# Patient Record
Sex: FEMALE | Race: WHITE | NOT HISPANIC OR LATINO | Employment: PART TIME | URBAN - METROPOLITAN AREA
[De-identification: names, ages, dates, MRNs, and addresses within clinical notes are randomized per-mention and may not be internally consistent; named-entity substitution may affect disease eponyms.]

---

## 2017-01-27 ENCOUNTER — PREP FOR SURGERY (OUTPATIENT)
Dept: ORTHOPEDIC SURGERY | Facility: CLINIC | Age: 22
End: 2017-01-27

## 2017-01-27 DIAGNOSIS — S83.002S PATELLAR SUBLUXATION, LEFT, SEQUELA: Primary | ICD-10-CM

## 2017-01-27 RX ORDER — ACETAMINOPHEN 10 MG/ML
1000 INJECTION, SOLUTION INTRAVENOUS ONCE
Status: CANCELLED | OUTPATIENT
Start: 2017-02-10

## 2017-01-27 RX ORDER — OXYCODONE HCL 10 MG/1
10 TABLET, FILM COATED, EXTENDED RELEASE ORAL ONCE
Status: CANCELLED | OUTPATIENT
Start: 2017-01-27 | End: 2017-01-27

## 2017-01-27 RX ORDER — CELECOXIB 100 MG/1
200 CAPSULE ORAL ONCE
Status: CANCELLED | OUTPATIENT
Start: 2017-01-27 | End: 2017-01-27

## 2017-01-27 RX ORDER — PREGABALIN 25 MG/1
75 CAPSULE ORAL ONCE
Status: CANCELLED | OUTPATIENT
Start: 2017-01-27 | End: 2017-01-27

## 2017-02-02 ENCOUNTER — LAB (OUTPATIENT)
Dept: LAB | Facility: HOSPITAL | Age: 22
End: 2017-02-02
Attending: ORTHOPAEDIC SURGERY

## 2017-02-02 DIAGNOSIS — S83.002S PATELLAR SUBLUXATION, LEFT, SEQUELA: ICD-10-CM

## 2017-02-02 LAB
ANION GAP SERPL CALCULATED.3IONS-SCNC: 11.3 MMOL/L
BASOPHILS # BLD AUTO: 0.05 10*3/MM3 (ref 0–0.2)
BASOPHILS NFR BLD AUTO: 0.7 % (ref 0–2)
BUN BLD-MCNC: 13 MG/DL (ref 6–20)
BUN/CREAT SERPL: 16 (ref 7–25)
CALCIUM SPEC-SCNC: 8.9 MG/DL (ref 8.6–10.5)
CHLORIDE SERPL-SCNC: 104 MMOL/L (ref 98–107)
CO2 SERPL-SCNC: 24.7 MMOL/L (ref 22–29)
CREAT BLD-MCNC: 0.81 MG/DL (ref 0.57–1)
DEPRECATED RDW RBC AUTO: 40.6 FL (ref 37–54)
EOSINOPHIL # BLD AUTO: 0.69 10*3/MM3 (ref 0.1–0.3)
EOSINOPHIL NFR BLD AUTO: 9.2 % (ref 0–4)
ERYTHROCYTE [DISTWIDTH] IN BLOOD BY AUTOMATED COUNT: 12.1 % (ref 11.5–14.5)
GFR SERPL CREATININE-BSD FRML MDRD: 90 ML/MIN/1.73
GLUCOSE BLD-MCNC: 97 MG/DL (ref 65–99)
HCT VFR BLD AUTO: 41.2 % (ref 37–47)
HGB BLD-MCNC: 14 G/DL (ref 12–16)
IMM GRANULOCYTES # BLD: 0.02 10*3/MM3 (ref 0–0.03)
IMM GRANULOCYTES NFR BLD: 0.3 % (ref 0–0.5)
LYMPHOCYTES # BLD AUTO: 2.6 10*3/MM3 (ref 0.6–4.8)
LYMPHOCYTES NFR BLD AUTO: 34.5 % (ref 20–45)
MCH RBC QN AUTO: 31 PG (ref 27–31)
MCHC RBC AUTO-ENTMCNC: 34 G/DL (ref 31–37)
MCV RBC AUTO: 91.2 FL (ref 81–99)
MONOCYTES # BLD AUTO: 0.4 10*3/MM3 (ref 0–1)
MONOCYTES NFR BLD AUTO: 5.3 % (ref 3–8)
NEUTROPHILS # BLD AUTO: 3.78 10*3/MM3 (ref 1.5–8.3)
NEUTROPHILS NFR BLD AUTO: 50 % (ref 45–70)
NRBC BLD MANUAL-RTO: 0 /100 WBC (ref 0–0)
PLATELET # BLD AUTO: 277 10*3/MM3 (ref 140–500)
PMV BLD AUTO: 10.6 FL (ref 7.4–10.4)
POTASSIUM BLD-SCNC: 3.9 MMOL/L (ref 3.5–5.2)
RBC # BLD AUTO: 4.52 10*6/MM3 (ref 4.2–5.4)
SODIUM BLD-SCNC: 140 MMOL/L (ref 136–145)
WBC NRBC COR # BLD: 7.54 10*3/MM3 (ref 4.8–10.8)

## 2017-02-02 PROCEDURE — 85025 COMPLETE CBC W/AUTO DIFF WBC: CPT

## 2017-02-02 PROCEDURE — 80048 BASIC METABOLIC PNL TOTAL CA: CPT

## 2017-02-02 PROCEDURE — 36415 COLL VENOUS BLD VENIPUNCTURE: CPT

## 2017-02-03 NOTE — PAT
Pt history updated by phone.  Pre-op labs completed except for UA. Will check w/Dr Salazar's office to see if can send patient over after H&P.  Also will need Accu-check, HCG, and EKG day of surgery per anesthesia guidelines.

## 2017-02-09 ENCOUNTER — ANESTHESIA EVENT (OUTPATIENT)
Dept: PERIOP | Facility: HOSPITAL | Age: 22
End: 2017-02-09

## 2017-02-09 ENCOUNTER — OFFICE VISIT (OUTPATIENT)
Dept: ORTHOPEDIC SURGERY | Facility: CLINIC | Age: 22
End: 2017-02-09

## 2017-02-09 ENCOUNTER — PREP FOR SURGERY (OUTPATIENT)
Dept: ORTHOPEDIC SURGERY | Facility: CLINIC | Age: 22
End: 2017-02-09

## 2017-02-09 DIAGNOSIS — S83.002S PATELLAR SUBLUXATION, LEFT, SEQUELA: Primary | ICD-10-CM

## 2017-02-09 PROCEDURE — S0260 H&P FOR SURGERY: HCPCS | Performed by: ORTHOPAEDIC SURGERY

## 2017-02-09 NOTE — H&P
"Orthopedic Surgery    Patient Care Team:  Daren Rodriguez MD as PCP - General (Emergency Medicine)    CHIEF COMPLAINT: Left knee pain    HISTORY OF PRESENT ILLNESS: Patient is being admitted to undergo reconstruction of her medial retinaculum and VMO advancement for chronically subluxing left patella.  Patient has symptoms for over 3 years she try to treated nonoperatively failed to respond to be admitted this time to undergo the above stated procedure.  I reviewed with the patient the risk of surgery which include failure of stabilization patella and the need for further surgery.  Vascular injury, nerve injury, DVT, recurrent subluxation, degenerative arthritis developing in the knee.  Understands the rehabilitation which is 3-6 months.  She understands all of the above and wishes to proceed with surgery.          Past Medical History   Diagnosis Date   • Recurrent left knee instability      sched patellafemoral junction repair     Past Surgical History   Procedure Laterality Date   • No past surgeries       Family History   Problem Relation Age of Onset   • Diabetes Father      Social History   Substance Use Topics   • Smoking status: Never Smoker   • Smokeless tobacco: Never Used   • Alcohol use No     No prescriptions prior to admission.     Allergies:  Review of patient's allergies indicates no known allergies.    REVIEW OF SYSTEMS:  Please see the above history of present illness for pertinent positives and negatives.  The remainder of the patient's systems have been reviewed and are negative.    Vital Signs       Flowsheet Rows         First Filed Value    Admission Height  61\" (154.9 cm) Documented at 02/03/2017 1331    Admission Weight  206 lb (93.4 kg) Documented at 02/03/2017 1331           Physical Exam:  Physical Exam   Constitutional: Patient appears well-developed and well-nourished and in no acute distress   HEENT:   Head: Normocephalic and atraumatic.   Eyes:  Pupils are equal, round, and reactive " to light.  Mouth and Throat: Patient has moist mucous membranes. Oropharynx is clear of any erythema or exudate.     Neck: Neck supple. No JVD present. No thyromegaly present. No lymphadenopathy present.  Cardiovascular: Regular rate, regular rhythm.  Pulmonary/Chest: Lungs are clear to auscultation bilaterally.  Abdominal:benign,soft with bowel sounds  Musculoskeletal: Normal posture.  Extremities: Left lower extremity shows good distal pulses no motor deficit.  There is mild crepitus range of motion with patella subluxation and extension.    Neurological: Patient is alert and oriented.  Psychological:   Mood and behavior appropriate.  Skin: Skin is warm and dry.     Results Review:    I reviewed the patient's new clinical results.  Lab Results (most recent)     None          Imaging Results (most recent)     None            ECG/EMG Results (most recent)     None          Assessment/Plan  subluxing left patella        I discussed the patients findings and my recommendations with patient and plan to proceed with medial retinacular reconstruction and VMO advancement    Saqib Salazar MD  02/09/17  2:36 PM

## 2017-02-09 NOTE — PROGRESS NOTES
Subjective: Painful left knee     Patient ID: Bisi Bedoya is a 21 y.o. female.    Chief Complaint:    History of Present Illness patient seen for H&P done ago medial retinacular reconstruction of the left patella with the VMO advancement       Social History     Occupational History   • Not on file.     Social History Main Topics   • Smoking status: Never Smoker   • Smokeless tobacco: Never Used   • Alcohol use No   • Drug use: No   • Sexual activity: Defer      Review of Systems   Constitutional: Negative for chills, diaphoresis, fever and unexpected weight change.   HENT: Negative for hearing loss, nosebleeds, sore throat and tinnitus.    Eyes: Negative for pain and visual disturbance.   Respiratory: Negative for cough, shortness of breath and wheezing.    Cardiovascular: Negative for chest pain and palpitations.   Gastrointestinal: Negative for abdominal pain, diarrhea, nausea and vomiting.   Endocrine: Negative for cold intolerance, heat intolerance and polydipsia.   Genitourinary: Negative for difficulty urinating, dysuria and hematuria.   Musculoskeletal: Negative for arthralgias, joint swelling and myalgias.   Skin: Negative for rash and wound.   Allergic/Immunologic: Negative for environmental allergies.   Neurological: Negative for dizziness, syncope and numbness.   Hematological: Does not bruise/bleed easily.   Psychiatric/Behavioral: Negative for dysphoric mood and sleep disturbance. The patient is not nervous/anxious.    All other systems reviewed and are negative.        Past Medical History   Diagnosis Date   • Recurrent left knee instability      sched patellafemoral junction repair     Past Surgical History   Procedure Laterality Date   • No past surgeries       Family History   Problem Relation Age of Onset   • Diabetes Father          Objective:  There were no vitals filed for this visit.  There were no vitals filed for this visit.  There is no height or weight on file to calculate  BMI.       Ortho Exam  H&P completed    Assessment:     No diagnosis found.      Plan:      all questions regarding surgery answered      Work Status:    DENICE query complete.    Orders:  No orders of the defined types were placed in this encounter.      Medications:  New Medications Ordered This Visit   Medications   • Ondansetron 4 MG film     Sig: Take  by mouth.       Followup:  No Follow-up on file.          Dragon transcription disclaimer     Much of this encounter note is an electronic transcription/translation of spoken language to printed text. The electronic translation of spoken language may permit erroneous, or at times, nonsensical words or phrases to be inadvertently transcribed. Although I have reviewed the note for such errors, some may still exist.

## 2017-02-10 ENCOUNTER — ANESTHESIA (OUTPATIENT)
Dept: PERIOP | Facility: HOSPITAL | Age: 22
End: 2017-02-10

## 2017-02-10 ENCOUNTER — HOSPITAL ENCOUNTER (OUTPATIENT)
Facility: HOSPITAL | Age: 22
Setting detail: OBSERVATION
Discharge: HOME | End: 2017-02-11
Attending: ORTHOPAEDIC SURGERY | Admitting: ORTHOPAEDIC SURGERY

## 2017-02-10 ENCOUNTER — APPOINTMENT (OUTPATIENT)
Dept: GENERAL RADIOLOGY | Facility: HOSPITAL | Age: 22
End: 2017-02-10

## 2017-02-10 DIAGNOSIS — R52 PAIN: ICD-10-CM

## 2017-02-10 DIAGNOSIS — S83.002S PATELLAR SUBLUXATION, LEFT, SEQUELA: ICD-10-CM

## 2017-02-10 DIAGNOSIS — S83.002D PATELLAR SUBLUXATION, LEFT, SUBSEQUENT ENCOUNTER: Primary | ICD-10-CM

## 2017-02-10 PROBLEM — S83.003A PATELLAR SUBLUXATION: Status: ACTIVE | Noted: 2017-02-10

## 2017-02-10 LAB — HCG SERPL QL: NEGATIVE

## 2017-02-10 PROCEDURE — 76000 FLUOROSCOPY <1 HR PHYS/QHP: CPT

## 2017-02-10 PROCEDURE — C1713 ANCHOR/SCREW BN/BN,TIS/BN: HCPCS | Performed by: ORTHOPAEDIC SURGERY

## 2017-02-10 PROCEDURE — 94799 UNLISTED PULMONARY SVC/PX: CPT

## 2017-02-10 PROCEDURE — 25010000002 PROPOFOL 10 MG/ML EMULSION: Performed by: NURSE ANESTHETIST, CERTIFIED REGISTERED

## 2017-02-10 PROCEDURE — 25010000002 ONDANSETRON PER 1 MG: Performed by: NURSE ANESTHETIST, CERTIFIED REGISTERED

## 2017-02-10 PROCEDURE — 25010000002 ONDANSETRON PER 1 MG: Performed by: ORTHOPAEDIC SURGERY

## 2017-02-10 PROCEDURE — 25010000003 CEFAZOLIN PER 500 MG: Performed by: ORTHOPAEDIC SURGERY

## 2017-02-10 PROCEDURE — G0378 HOSPITAL OBSERVATION PER HR: HCPCS

## 2017-02-10 PROCEDURE — 25010000002 DIPHENHYDRAMINE PER 50 MG: Performed by: NURSE ANESTHETIST, CERTIFIED REGISTERED

## 2017-02-10 PROCEDURE — 84703 CHORIONIC GONADOTROPIN ASSAY: CPT | Performed by: ORTHOPAEDIC SURGERY

## 2017-02-10 PROCEDURE — 27422 REVISION OF UNSTABLE KNEECAP: CPT | Performed by: ORTHOPAEDIC SURGERY

## 2017-02-10 PROCEDURE — 25010000002 ROPIVACAINE PER 1 MG

## 2017-02-10 PROCEDURE — C1776 JOINT DEVICE (IMPLANTABLE): HCPCS | Performed by: ORTHOPAEDIC SURGERY

## 2017-02-10 PROCEDURE — 25010000002 MIDAZOLAM PER 1 MG: Performed by: NURSE ANESTHETIST, CERTIFIED REGISTERED

## 2017-02-10 DEVICE — SYS KN MPFL BIOCOMP: Type: IMPLANTABLE DEVICE | Site: KNEE | Status: FUNCTIONAL

## 2017-02-10 DEVICE — TNDN VERSAGRAFT PRE-SUT FZ STRL: Type: IMPLANTABLE DEVICE | Site: KNEE | Status: FUNCTIONAL

## 2017-02-10 DEVICE — SUT/ANCH BIOCOMP SWIVELOCK/C 4.75X19.1MM: Type: IMPLANTABLE DEVICE | Site: KNEE | Status: FUNCTIONAL

## 2017-02-10 RX ORDER — AMITRIPTYLINE HYDROCHLORIDE 10 MG/1
10 TABLET, FILM COATED ORAL NIGHTLY
Status: DISCONTINUED | OUTPATIENT
Start: 2017-02-10 | End: 2017-02-11 | Stop reason: HOSPADM

## 2017-02-10 RX ORDER — ONDANSETRON 2 MG/ML
4 INJECTION INTRAMUSCULAR; INTRAVENOUS ONCE AS NEEDED
Status: DISCONTINUED | OUTPATIENT
Start: 2017-02-10 | End: 2017-02-10 | Stop reason: HOSPADM

## 2017-02-10 RX ORDER — BACITRACIN ZINC 500 [USP'U]/G
OINTMENT TOPICAL AS NEEDED
Status: DISCONTINUED | OUTPATIENT
Start: 2017-02-10 | End: 2017-02-10 | Stop reason: HOSPADM

## 2017-02-10 RX ORDER — SUMATRIPTAN 25 MG/1
50 TABLET, FILM COATED ORAL ONCE
Status: DISCONTINUED | OUTPATIENT
Start: 2017-02-10 | End: 2017-02-11 | Stop reason: HOSPADM

## 2017-02-10 RX ORDER — OXYCODONE AND ACETAMINOPHEN 7.5; 325 MG/1; MG/1
2 TABLET ORAL EVERY 4 HOURS PRN
Status: DISCONTINUED | OUTPATIENT
Start: 2017-02-10 | End: 2017-02-11 | Stop reason: HOSPADM

## 2017-02-10 RX ORDER — NALOXONE HCL 0.4 MG/ML
0.4 VIAL (ML) INJECTION AS NEEDED
Status: DISCONTINUED | OUTPATIENT
Start: 2017-02-10 | End: 2017-02-10 | Stop reason: HOSPADM

## 2017-02-10 RX ORDER — ONDANSETRON 2 MG/ML
4 INJECTION INTRAMUSCULAR; INTRAVENOUS ONCE AS NEEDED
Status: COMPLETED | OUTPATIENT
Start: 2017-02-10 | End: 2017-02-10

## 2017-02-10 RX ORDER — MIDAZOLAM HYDROCHLORIDE 1 MG/ML
1 INJECTION INTRAMUSCULAR; INTRAVENOUS
Status: DISCONTINUED | OUTPATIENT
Start: 2017-02-10 | End: 2017-02-10 | Stop reason: HOSPADM

## 2017-02-10 RX ORDER — PREGABALIN 75 MG/1
75 CAPSULE ORAL ONCE
Status: COMPLETED | OUTPATIENT
Start: 2017-02-10 | End: 2017-02-10

## 2017-02-10 RX ORDER — MEPERIDINE HYDROCHLORIDE 25 MG/ML
12.5 INJECTION INTRAMUSCULAR; INTRAVENOUS; SUBCUTANEOUS
Status: DISCONTINUED | OUTPATIENT
Start: 2017-02-10 | End: 2017-02-10 | Stop reason: HOSPADM

## 2017-02-10 RX ORDER — CELECOXIB 100 MG/1
200 CAPSULE ORAL ONCE
Status: COMPLETED | OUTPATIENT
Start: 2017-02-10 | End: 2017-02-10

## 2017-02-10 RX ORDER — FAMOTIDINE 10 MG/ML
20 INJECTION, SOLUTION INTRAVENOUS
Status: DISCONTINUED | OUTPATIENT
Start: 2017-02-10 | End: 2017-02-10

## 2017-02-10 RX ORDER — OXYCODONE HYDROCHLORIDE AND ACETAMINOPHEN 5; 325 MG/1; MG/1
1 TABLET ORAL ONCE AS NEEDED
Status: DISCONTINUED | OUTPATIENT
Start: 2017-02-10 | End: 2017-02-10 | Stop reason: HOSPADM

## 2017-02-10 RX ORDER — MIDAZOLAM HYDROCHLORIDE 1 MG/ML
1 INJECTION INTRAMUSCULAR; INTRAVENOUS
Status: DISCONTINUED | OUTPATIENT
Start: 2017-02-10 | End: 2017-02-10

## 2017-02-10 RX ORDER — OXYCODONE HCL 10 MG/1
10 TABLET, FILM COATED, EXTENDED RELEASE ORAL ONCE
Status: COMPLETED | OUTPATIENT
Start: 2017-02-10 | End: 2017-02-10

## 2017-02-10 RX ORDER — MAGNESIUM HYDROXIDE 1200 MG/15ML
LIQUID ORAL AS NEEDED
Status: DISCONTINUED | OUTPATIENT
Start: 2017-02-10 | End: 2017-02-10 | Stop reason: HOSPADM

## 2017-02-10 RX ORDER — ROPIVACAINE HYDROCHLORIDE 5 MG/ML
INJECTION, SOLUTION EPIDURAL; INFILTRATION; PERINEURAL
Status: COMPLETED
Start: 2017-02-10 | End: 2017-02-10

## 2017-02-10 RX ORDER — DIPHENHYDRAMINE HYDROCHLORIDE 50 MG/ML
12.5 INJECTION INTRAMUSCULAR; INTRAVENOUS
Status: DISCONTINUED | OUTPATIENT
Start: 2017-02-10 | End: 2017-02-10 | Stop reason: HOSPADM

## 2017-02-10 RX ORDER — MIDAZOLAM HYDROCHLORIDE 1 MG/ML
2 INJECTION INTRAMUSCULAR; INTRAVENOUS
Status: DISCONTINUED | OUTPATIENT
Start: 2017-02-10 | End: 2017-02-10

## 2017-02-10 RX ORDER — PROPOFOL 10 MG/ML
VIAL (ML) INTRAVENOUS AS NEEDED
Status: DISCONTINUED | OUTPATIENT
Start: 2017-02-10 | End: 2017-02-10 | Stop reason: SURG

## 2017-02-10 RX ORDER — ACETAMINOPHEN 10 MG/ML
1000 INJECTION, SOLUTION INTRAVENOUS ONCE
Status: COMPLETED | OUTPATIENT
Start: 2017-02-10 | End: 2017-02-10

## 2017-02-10 RX ORDER — IPRATROPIUM BROMIDE AND ALBUTEROL SULFATE 2.5; .5 MG/3ML; MG/3ML
3 SOLUTION RESPIRATORY (INHALATION) ONCE AS NEEDED
Status: DISCONTINUED | OUTPATIENT
Start: 2017-02-10 | End: 2017-02-10 | Stop reason: HOSPADM

## 2017-02-10 RX ORDER — SODIUM CHLORIDE, SODIUM LACTATE, POTASSIUM CHLORIDE, CALCIUM CHLORIDE 600; 310; 30; 20 MG/100ML; MG/100ML; MG/100ML; MG/100ML
9 INJECTION, SOLUTION INTRAVENOUS CONTINUOUS
Status: DISCONTINUED | OUTPATIENT
Start: 2017-02-10 | End: 2017-02-10

## 2017-02-10 RX ORDER — SODIUM CHLORIDE 0.9 % (FLUSH) 0.9 %
1-10 SYRINGE (ML) INJECTION AS NEEDED
Status: DISCONTINUED | OUTPATIENT
Start: 2017-02-10 | End: 2017-02-10

## 2017-02-10 RX ORDER — LIDOCAINE HYDROCHLORIDE 20 MG/ML
INJECTION, SOLUTION INFILTRATION; PERINEURAL AS NEEDED
Status: DISCONTINUED | OUTPATIENT
Start: 2017-02-10 | End: 2017-02-10 | Stop reason: SURG

## 2017-02-10 RX ORDER — HYDROMORPHONE HCL 110MG/55ML
0.5 PATIENT CONTROLLED ANALGESIA SYRINGE INTRAVENOUS
Status: DISCONTINUED | OUTPATIENT
Start: 2017-02-10 | End: 2017-02-10 | Stop reason: HOSPADM

## 2017-02-10 RX ORDER — AMITRIPTYLINE HYDROCHLORIDE 25 MG/1
TABLET, FILM COATED ORAL
Status: DISCONTINUED
Start: 2017-02-10 | End: 2017-02-10 | Stop reason: WASHOUT

## 2017-02-10 RX ORDER — ASPIRIN 325 MG
325 TABLET, DELAYED RELEASE (ENTERIC COATED) ORAL DAILY
Status: DISCONTINUED | OUTPATIENT
Start: 2017-02-11 | End: 2017-02-11 | Stop reason: HOSPADM

## 2017-02-10 RX ORDER — ONDANSETRON 2 MG/ML
4 INJECTION INTRAMUSCULAR; INTRAVENOUS EVERY 6 HOURS PRN
Status: DISCONTINUED | OUTPATIENT
Start: 2017-02-10 | End: 2017-02-11 | Stop reason: HOSPADM

## 2017-02-10 RX ORDER — DIPHENHYDRAMINE HYDROCHLORIDE 50 MG/ML
12.5 INJECTION INTRAMUSCULAR; INTRAVENOUS ONCE
Status: COMPLETED | OUTPATIENT
Start: 2017-02-10 | End: 2017-02-10

## 2017-02-10 RX ORDER — LIDOCAINE HYDROCHLORIDE 10 MG/ML
0.3 INJECTION, SOLUTION EPIDURAL; INFILTRATION; INTRACAUDAL; PERINEURAL ONCE
Status: COMPLETED | OUTPATIENT
Start: 2017-02-10 | End: 2017-02-10

## 2017-02-10 RX ADMIN — PREGABALIN 75 MG: 75 CAPSULE ORAL at 12:52

## 2017-02-10 RX ADMIN — SODIUM CHLORIDE, POTASSIUM CHLORIDE, SODIUM LACTATE AND CALCIUM CHLORIDE 9 ML/HR: 600; 310; 30; 20 INJECTION, SOLUTION INTRAVENOUS at 12:40

## 2017-02-10 RX ADMIN — AMITRIPTYLINE HYDROCHLORIDE 10 MG: 10 TABLET, FILM COATED ORAL at 21:57

## 2017-02-10 RX ADMIN — CEFAZOLIN SODIUM 2 G: 2 SOLUTION INTRAVENOUS at 21:59

## 2017-02-10 RX ADMIN — MIDAZOLAM HYDROCHLORIDE 2 MG: 1 INJECTION, SOLUTION INTRAMUSCULAR; INTRAVENOUS at 14:01

## 2017-02-10 RX ADMIN — DIPHENHYDRAMINE HYDROCHLORIDE 12.5 MG: 50 INJECTION, SOLUTION INTRAMUSCULAR; INTRAVENOUS at 13:33

## 2017-02-10 RX ADMIN — OXYCODONE HYDROCHLORIDE AND ACETAMINOPHEN 2 TABLET: 7.5; 325 TABLET ORAL at 21:57

## 2017-02-10 RX ADMIN — ONDANSETRON 4 MG: 2 INJECTION, SOLUTION INTRAMUSCULAR; INTRAVENOUS at 18:13

## 2017-02-10 RX ADMIN — ROPIVACAINE HYDROCHLORIDE 30 ML: 5 INJECTION, SOLUTION EPIDURAL; INFILTRATION; PERINEURAL at 14:06

## 2017-02-10 RX ADMIN — PROPOFOL 200 MG: 10 INJECTION, EMULSION INTRAVENOUS at 14:21

## 2017-02-10 RX ADMIN — ONDANSETRON 4 MG: 2 INJECTION, SOLUTION INTRAMUSCULAR; INTRAVENOUS at 23:03

## 2017-02-10 RX ADMIN — LIDOCAINE HYDROCHLORIDE 80 MG: 20 INJECTION, SOLUTION INFILTRATION; PERINEURAL at 14:21

## 2017-02-10 RX ADMIN — CELECOXIB 200 MG: 100 CAPSULE ORAL at 12:52

## 2017-02-10 RX ADMIN — FAMOTIDINE 20 MG: 10 INJECTION, SOLUTION INTRAVENOUS at 13:33

## 2017-02-10 RX ADMIN — ACETAMINOPHEN 1000 MG: 10 INJECTION, SOLUTION INTRAVENOUS at 12:52

## 2017-02-10 RX ADMIN — OXYCODONE HYDROCHLORIDE 10 MG: 10 TABLET, FILM COATED, EXTENDED RELEASE ORAL at 12:52

## 2017-02-10 RX ADMIN — ONDANSETRON 4 MG: 2 INJECTION, SOLUTION INTRAMUSCULAR; INTRAVENOUS at 13:33

## 2017-02-10 RX ADMIN — SODIUM CHLORIDE, POTASSIUM CHLORIDE, SODIUM LACTATE AND CALCIUM CHLORIDE: 600; 310; 30; 20 INJECTION, SOLUTION INTRAVENOUS at 14:00

## 2017-02-10 RX ADMIN — LIDOCAINE HYDROCHLORIDE 0.3 ML: 10 INJECTION, SOLUTION EPIDURAL; INFILTRATION; INTRACAUDAL; PERINEURAL at 12:40

## 2017-02-10 NOTE — ANESTHESIA PREPROCEDURE EVALUATION
Anesthesia Evaluation     Patient summary reviewed and Nursing notes reviewed   no history of anesthetic complications:  NPO Status: > 8 hours   Airway   Mallampati: I  TM distance: >3 FB  Neck ROM: full  no difficulty expected  Dental    (+) poor dentation    Pulmonary - normal exam    breath sounds clear to auscultation  Sleep apnea: snores.  Cardiovascular - negative cardio ROS and normal exam    Rhythm: regular  Rate: normal        Neuro/Psych  (+) psychiatric history Anxiety and Depression,    Seizures: as infant.  GI/Hepatic/Renal/Endo    (+) obesity,      Musculoskeletal (-) negative ROS    Abdominal   (+) obese,    Substance History - negative use     OB/GYN          Other - negative ROS                                   Anesthesia Plan    ASA 2     general and regional     intravenous induction   Anesthetic plan and risks discussed with patient.  Use of blood products discussed with patient  Consented to blood products.

## 2017-02-10 NOTE — PLAN OF CARE
Problem: Perioperative Period (Adult)  Goal: Signs and Symptoms of Listed Potential Problems Will be Absent or Manageable (Perioperative Period)  Outcome: Ongoing (interventions implemented as appropriate)    02/10/17 1214   Perioperative Period   Problems Assessed (Perioperative Period) all   Problems Present (Perioperative Period) none

## 2017-02-10 NOTE — PLAN OF CARE
Problem: Patient Care Overview (Adult)  Goal: Adult Individualization and Mutuality  Outcome: Ongoing (interventions implemented as appropriate)    02/10/17 1214   Individualization   Patient Specific Preferences goes by monique   Mutuality/Individual Preferences   What Anxieties, Fears or Concerns Do You Have About Your Health or Care? generalized anxiety

## 2017-02-10 NOTE — ANESTHESIA POSTPROCEDURE EVALUATION
Patient: Bisi Bedoya    Procedure Summary     Date Anesthesia Start Anesthesia Stop Room / Location    02/10/17 1418 1547 [facility]       Procedure Diagnosis Surgeon Provider    PATELLOFEMORAL JUNCTION REPAIR with VMO advancement and medial retinacular reconstruction (Left Knee) Patellar subluxation, left, sequela  (Patellar subluxation, left, sequela [S83.002S]) MD Yannick Garber CRNA          Anesthesia Type: general, regional  Last vitals  BP      Temp      Pulse     Resp      SpO2        Post Anesthesia Care and Evaluation    Patient location during evaluation: bedside  Patient participation: complete - patient participated  Level of consciousness: awake and alert  Pain score: 0  Pain management: adequate  Airway patency: patent  Anesthetic complications: No anesthetic complications    Cardiovascular status: acceptable  Respiratory status: acceptable  Hydration status: acceptable

## 2017-02-10 NOTE — ANESTHESIA PROCEDURE NOTES
Airway  Urgency: elective    Airway not difficult    General Information and Staff    Patient location during procedure: OR  CRNA: KELLI THOMAS    Indications and Patient Condition  Indications for airway management: airway protection    Preoxygenated: yes  MILS not maintained throughout  Mask difficulty assessment: 1 - vent by mask    Final Airway Details  Final airway type: supraglottic airway      Successful airway: classic  Size 4    Number of attempts at approach: 1    Additional Comments  Atraumatic.  LMA good seal

## 2017-02-10 NOTE — ANESTHESIA PROCEDURE NOTES
Peripheral Block    Patient location during procedure: pre-op  Reason for block: post-op pain management  Performed by  CRNA: MINE BELLO  Preanesthetic Checklist  Completed: patient identified, site marked, surgical consent, pre-op evaluation, timeout performed, IV checked, risks and benefits discussed and monitors and equipment checked  Peripheral Block Prep:  Sterile barriers:cap, gloves, gown, mask and sterile barriers  Prep: ChloraPrep  Patient monitoring: blood pressure monitoring, continuous pulse oximetry and EKG  Peripheral Procedure  Sedation:yes  Guidance:ultrasound guided  Images:still images obtained  Laterality:leftBlock Type:femoral  Injection Technique:single-shotNeedle Type:echogenic  Needle Gauge:22 G    Medications  Local Injected:ropivacaine 0.5% without epinephrine Local Amount Injected:30mL  Post Assessment  Patient Tolerance:comfortable throughout block  Complications:no

## 2017-02-10 NOTE — PLAN OF CARE
Problem: Patient Care Overview (Adult)  Goal: Plan of Care Review  Outcome: Ongoing (interventions implemented as appropriate)    02/10/17 1214   Coping/Psychosocial Response Interventions   Plan Of Care Reviewed With patient   Patient Care Overview   Progress improving   Outcome Evaluation   Outcome Summary/Follow up Plan vss, ready for procedure

## 2017-02-10 NOTE — OP NOTE
Date of Operation:  02/10/2017     PREOPERATIVE DIAGNOSIS: Chronic subluxation, left patella.     POSTOPERATIVE DIAGNOSIS: Chronic subluxation, left patella.     PROCEDURE PERFORMED: Medial retinacular ligamentous repair with vastus medialis obliquus advancement, left knee.      SURGEON: Saqib Salazar MD    ASSISTANT: Winston Rodríguez CSA     ANESTHESIA: General with adductor canal block.     TOURNIQUET TIME: 33 minutes.     DRAINS: None.     COMPLICATIONS: None.     DESCRIPTION OF PROCEDURE: The patient was brought to the operating room and after satisfactory anesthesia was obtained, timeout was completed identifying the patient and procedure correctly. Tourniquet was placed around the left upper leg. The leg was then prepped and after the prep dried for 3 minutes, it was draped in sterile field in the usual manner. Timeout again completed, identifying the patient and the procedure correctly. The patient was given preoperatively 2 g of Kefzol 1 g of IV Tylenol. After exsanguination, a medial incision was made from the inferior pole of the patella along the medial border proximally for about 8 cm. Via blunt and sharp dissection, the medial border of the patella and the VMO were identified.  Attention was first directed towards the retinacular reconstruction using the Arthrex system. Two guide pins were inserted in the patella, one in the proximal one-third and the 2nd parallel in the distal one-third in the midportion of the patella. Proper placement was verified with the fluoroscope. Reaming with the 4.0 drill was carried out to a depth of about 25 mm. An allograft tendon which was previously prepared with whip sutures in each end then was inserted via a bioabsorbable anchor screw and each of these holes in the patella. The tendon was looped then had suture passed through the midportion of it for later reconstruction. The interval between the medial collateral ligament and the medial retinacular of the bone was  developed and then a guide pin was inserted through a separate medial stab wound in _____  line, just anterior to it and slightly proximal. This guide pin was inserted all the way across the tibia. Over reaming with the drill was then carried out and then another anchor was used. First, the suture with the looped end was passed through the islet of the guide pin and the tendon was then passed into the bone itself. Pressure and tension were placed on it pulling patella more medially, and then the anchor was used to secure the tendon within the femur itself. Range of motion showed good tension but flexion of the patella was possible. Once the medial retinacular reconstruction was carried out, the VMO advance was carried out. The vastus medialis obliquus was then released from its attachment along the superior border of the patella and advanced medially and distally and sutured with #2 FiberWire. Once this was completed, tourniquet was released. Hemostasis was obtained with electrocautery. The wound was infiltrated with 40 mL of Exparel solution, which is 20 mL of Exparel and 20 mL of 0.25% plain Marcaine. Subcutaneous was closed with 0 Vicryl and 2-0 Vicryl. All wounds were closed with 2-0 Vicryl, and Prineo dressing was put on all wounds. The patient was then placed in a long-leg cast with the knee flexed approximately 30°. She was awakened and taken to recovery, having tolerated the procedure well.       RISHABH Tilley/antony  D:  02/10/2017 15:50:22   T:  02/10/2017 16:01:32   Job ID:  [id number]   Document ID:  [id number]  cc:

## 2017-02-11 VITALS
DIASTOLIC BLOOD PRESSURE: 66 MMHG | HEART RATE: 79 BPM | HEIGHT: 63 IN | SYSTOLIC BLOOD PRESSURE: 122 MMHG | OXYGEN SATURATION: 97 % | BODY MASS INDEX: 35.86 KG/M2 | TEMPERATURE: 97.5 F | RESPIRATION RATE: 20 BRPM | WEIGHT: 202.4 LBS

## 2017-02-11 PROCEDURE — G8989 SELF CARE D/C STATUS: HCPCS

## 2017-02-11 PROCEDURE — G8988 SELF CARE GOAL STATUS: HCPCS

## 2017-02-11 PROCEDURE — 97161 PT EVAL LOW COMPLEX 20 MIN: CPT | Performed by: PHYSICAL THERAPIST

## 2017-02-11 PROCEDURE — 99024 POSTOP FOLLOW-UP VISIT: CPT | Performed by: ORTHOPAEDIC SURGERY

## 2017-02-11 PROCEDURE — G0378 HOSPITAL OBSERVATION PER HR: HCPCS

## 2017-02-11 PROCEDURE — 97165 OT EVAL LOW COMPLEX 30 MIN: CPT

## 2017-02-11 PROCEDURE — 25010000003 CEFAZOLIN PER 500 MG: Performed by: ORTHOPAEDIC SURGERY

## 2017-02-11 PROCEDURE — G8987 SELF CARE CURRENT STATUS: HCPCS

## 2017-02-11 PROCEDURE — 25010000002 PROMETHAZINE PER 50 MG

## 2017-02-11 RX ORDER — PROMETHAZINE HYDROCHLORIDE 25 MG/ML
INJECTION, SOLUTION INTRAMUSCULAR; INTRAVENOUS
Status: COMPLETED
Start: 2017-02-11 | End: 2017-02-11

## 2017-02-11 RX ORDER — OXYCODONE AND ACETAMINOPHEN 7.5; 325 MG/1; MG/1
2 TABLET ORAL EVERY 4 HOURS PRN
Qty: 50 TABLET | Refills: 0 | Status: SHIPPED | OUTPATIENT
Start: 2017-02-11 | End: 2017-02-20

## 2017-02-11 RX ADMIN — ASPIRIN 325 MG: 325 TABLET, DELAYED RELEASE ORAL at 08:34

## 2017-02-11 RX ADMIN — OXYCODONE HYDROCHLORIDE AND ACETAMINOPHEN 2 TABLET: 7.5; 325 TABLET ORAL at 10:04

## 2017-02-11 RX ADMIN — CEFAZOLIN SODIUM 2 G: 2 SOLUTION INTRAVENOUS at 12:09

## 2017-02-11 RX ADMIN — CEFAZOLIN SODIUM 2 G: 2 SOLUTION INTRAVENOUS at 06:38

## 2017-02-11 RX ADMIN — PROMETHAZINE HYDROCHLORIDE 12.5 MG: 25 INJECTION INTRAMUSCULAR; INTRAVENOUS at 04:49

## 2017-02-11 RX ADMIN — OXYCODONE HYDROCHLORIDE AND ACETAMINOPHEN 2 TABLET: 7.5; 325 TABLET ORAL at 14:50

## 2017-02-11 NOTE — DISCHARGE SUMMARY
Discharge Summary    Patient name: Bisi Bedoya    Medical record number: [id number]    Admission date: 2/10/2017  Discharge date:   2/11/17    Attending physician: Martin    Primary care physician: Daren Rodriguez MD    Referring physician:     Consulting physician(s):    Condition on discharge: Stable    Primary Diagnoses: Subluxing left patella    Secondary Diagnoses:     Operative Procedure:     Hospital Course: The patient is a very pleasant 21 y.o. female that was admitted to the hospital with chronically subluxing left patella was taken the operating room on the 10th at the appropriate evaluation for medial retinacular reconstruction and VMO advancement.  Postoperative day 1 she is doing well pain was well-controlled oral medication.  She is discharged home after therapy seen in this morning for nonweightbearing gait training.  She will be discharged home with aspirin daily for DVT prophylaxis and pain medication.     Bisi Bedoya   Home Medication Instructions ARLIN:[id number]    Printed on:02/11/17 0837   Medication Information                      amitriptyline (ELAVIL) 10 MG tablet  TAKE 1 TABLET AT BEDTIME.             aspirin  MG EC tablet  Take 1 tablet by mouth Daily.             oxyCODONE-acetaminophen (PERCOCET) 7.5-325 MG per tablet  Take 2 tablets by mouth Every 4 (Four) Hours As Needed for moderate pain (4-6) or severe pain (7-10) for up to 9 days.             SPRINTEC 28 0.25-35 MG-MCG per tablet  TAKE ONE TABLET DAILY             SUMAtriptan (IMITREX) 50 MG tablet  Take one tablet at onset of headache. May repeat dose one time in 2 hours if headache not relieved.             Vitamin D, Cholecalciferol, 400 UNITS chewable tablet  Chew 1 tablet Daily.                 Discharge instructions:   remain nonweightbearing left leg.        Follow-up appointment:  keep scheduled follow-up appointment.

## 2017-02-11 NOTE — PLAN OF CARE
Problem: Patient Care Overview (Adult)  Goal: Plan of Care Review    02/11/17 1000   Coping/Psychosocial Response Interventions   Plan Of Care Reviewed With patient   Outcome Evaluation   Outcome Summary/Follow up Plan OT evaluation completed. pt is sba for functional transfers with rolling walker. discussed with pt and mom pt may require assist with adl setup and LB adls while in LE cast. mom states pt has assist available at home. no other OT concerns at this time. pt being discharged from facility today per surgeon.

## 2017-02-11 NOTE — THERAPY DISCHARGE NOTE
Acute Care - Physical Therapy Initial Eval/Discharge   Helena Gonzalez     Patient Name: Bisi Bedoya  : 1995  MRN: [id number]  Today's Date: 2017   Onset of Illness/Injury or Date of Surgery Date: 02/10/17  Date of Referral to PT: 02/10/17  Referring Physician: Dr. Salazar      Admit Date: 2/10/2017    Visit Dx:    ICD-10-CM ICD-9-CM   1. Patellar subluxation, left, sequela S83.002S 905.6   2. Pain R52 780.96     Patient Active Problem List   Diagnosis   • Patellar subluxation     Past Medical History   Diagnosis Date   • Anxiety    • Depression    • Migraine    • Recurrent left knee instability      sched patellafemoral junction repair     Past Surgical History   Procedure Laterality Date   • Tonsillectomy and adenoidectomy     • Patella surgery  02/10/2017          PT ASSESSMENT (last 72 hours)      PT Evaluation       17 0821 17 0820    Rehab Evaluation    Document Type evaluation  - evaluation  -    Subjective Information agree to therapy;no complaints  - agree to therapy;no complaints  -J    Patient Effort, Rehab Treatment excellent  - excellent  -    General Information    Patient Profile Review yes  - yes  -    Onset of Illness/Injury or Date of Surgery Date 02/10/17  - 02/10/17  -    Referring Physician Dr. Salazar  - Martin  [facility]    General Observations Pt originally seen supine in bed with mom present. Had O2 per nasal canula.Pt has long leg cast left LE.  - pt supine in bed, mom in room agreed to evaluation  -    Pertinent History Of Current Problem Pt with history of recurrent left patella subluxations. Conservative therapy unsuccessful. Pt underwent medial patello-femoral ligament reconstruction with vastus medialis advancement on 2/10/2017.  - pt admitted for reconstruction of her medial retinaculum and VMO advancement due to chronically subluxing L patella  -JJ    Precautions/Limitations fall precautions;non-weight bearing status   NWBing left LE  -  fall precautions;non-weight bearing status  -JJ    Prior Level of Function independent:  - independent:;all household mobility;gait;transfer;ADL's  -J    Equipment Currently Used at Home crutches  - crutches   used occasionally when needed  -JJ    Plans/Goals Discussed With patient and family;agreed upon  - patient and family;agreed upon  -JJ    Risks Reviewed patient and family:;LOB;increased discomfort  - patient and family:;increased discomfort  -JJ    Benefits Reviewed patient and family:;improve function;increase independence  - patient and family:;improve function  -JJ    Barriers to Rehab none identified  - none identified  -JJ    Living Environment    Lives With parent(s)  - parent(s)   dad  -    Living Arrangements house  - house  -JJ    Home Accessibility no concerns  -     Clinical Impression    Date of Referral to PT 02/10/17  -     PT Diagnosis S/P patellar realignment with vastus advancement  -     Prognosis good  -     Pain Assessment    Pain Assessment No/denies pain  - No/denies pain  -JJ    ROM (Range of Motion)    General ROM Detail General right LE ROM WFL, left hip ROM generally WFL, long leg cast prevents left knee and ankle ROM  - B UE AROM WFL  -JJ    MMT (Manual Muscle Testing)    General MMT Assessment Detail Right LE strength WFL, left hip strength WFL, left knee and ankle strength NT due to cast  - B UE MMT WFL  -JJ    Bed Mobility, Assessment/Treatment    Bed Mob, Supine to Sit, [place] contact guard assist  - contact guard assist  -JJ    Transfer Assessment/Treatment    Transfers, Sit-Stand [place] stand by assist  - stand by assist;verbal cues required  -JJ    Transfers, Stand-Sit [place] stand by assist  - stand by assist;verbal cues required  -JJ    Transfers, Sit-Stand-Sit, Assist Device standard walker  - rolling walker  -    Gait Assessment/Treatment    Gait, [place] Level stand by assist  -     Gait, Assistive  Device standard walker  -     Gait, Distance (Feet) 15  -GC     Gait, Gait Deviations other (see comments)   NWBing left LE  -GC     Gait, Maintain Weight Bearing Status able to maintain weight bearing status  -     Sensory Assessment/Intervention    Sensory Impairment  --   pt states L LE still numb from sx  -JJ    Positioning and Restraints    Pre-Treatment Position in bed  -GC in bed  -JJ    Post Treatment Position chair  - chair  -    In Chair reclined;call light within reach;with family/caregiver;encouraged to call for assist  - reclined;call light within reach;encouraged to call for assist;with family/caregiver  -      02/10/17 1713 02/10/17 1710    General Information    Equipment Currently Used at Home  crutches  -PB    Living Environment    Lives With parent(s)   dad  -PB     Living Arrangements house  -PB     Stair Railings at Home outside, present on right side  -PB     Type of Financial/Environmental Concern none  -PB     Transportation Available car  -PB       User Key  (r) = Recorded By, (t) = Taken By, (c) = Cosigned By    Initials Name Provider Type     Sam Valente, PT Physical Therapist    PB Zahra Lema, RN Registered Nurse    TONI Dwyer, OTR Occupational Therapist          Physical Therapy Education     Title: PT OT SLP Therapies (Done)     Topic: Physical Therapy (Done)     Point: Mobility training (Done)    Learning Progress Summary    Learner Readiness Method Response Comment Documented by Status   Patient Acceptance [facility] 02/11/17 1025 Done   Family Acceptance [facility] 02/11/17 1025 Done               Point: Precautions (Done)    Learning Progress Summary    Learner Readiness Method Response Comment Documented by Status   Patient Acceptance [facility] 02/11/17 1025 Done   Family Acceptance [facility] 02/11/17 1025 Done                      User Key     Initials Effective Dates Name Provider Type Discipline     12/30/16 -  Sam Valente, PT Physical Therapist PT                 PT Recommendation and Plan  Anticipated Equipment Needs At Discharge: walker  Anticipated Discharge Disposition: home                 Outcome Measures       02/11/17 0821 02/11/17 0820       How much help from another person do you currently need...    Turning from your back to your side while in flat bed without using bedrails? 4  -GC      Moving from lying on back to sitting on the side of a flat bed without bedrails? 3  -GC      Moving to and from a bed to a chair (including a wheelchair)? 3  -GC      Standing up from a chair using your arms (e.g., wheelchair, bedside chair)? 3  -GC      Climbing 3-5 steps with a railing? 3  -GC      To walk in hospital room? 3  -GC      AM-PAC 6 Clicks Score 19  -GC      How much help from another is currently needed...    Putting on and taking off regular lower body clothing?  3  -JJ     Bathing (including washing, rinsing, and drying)  3  -JJ     Toileting (which includes using toilet bed pan or urinal)  4  -JJ     Putting on and taking off regular upper body clothing  4  -JJ     Taking care of personal grooming (such as brushing teeth)  4  -JJ     Eating meals  4  -JJ     Score  22  -JJ     Functional Assessment    Outcome Measure Options  AM-PAC 6 Clicks Daily Activity (OT)  -JJ       User Key  (r) = Recorded By, (t) = Taken By, (c) = Cosigned By    Initials Name Provider Type     Sam Valente PT Physical Therapist    JIVANA Dwyer, OTR Occupational Therapist           Time Calculation:         PT Charges       02/11/17 1026          Time Calculation    Start Time 0821  -      Stop Time 0845  -      Time Calculation (min) 24 min  -        User Key  (r) = Recorded By, (t) = Taken By, (c) = Cosigned By    Initials Name Provider Type     Sam Valente PT Physical Therapist          Therapy Charges for Today     Code Description Service Date Service Provider Modifiers Qty    42924776847  PT EVAL LOW COMPLEXITY 2 2/11/2017 Sam Valente  PT GP 1          PT G-Codes  Outcome Measure Options: AM-PAC 6 Clicks Daily Activity (OT)    PT Discharge Summary  Anticipated Discharge Disposition: home    Sam Valente, PT  2/11/2017

## 2017-02-11 NOTE — PROGRESS NOTES
Orthopedic Progress Note   Chief Complaint:  Status post medial retinacular repair and VMO advancement left knee    Subjective     Interval History: Patient postop day 1 is doing well is afebrile hemodynamically stable.          Objective     Vital Signs  Temp:  [96.1 °F (35.6 °C)-98.1 °F (36.7 °C)] 97.5 °F (36.4 °C)  Heart Rate:  [74-98] 79  Resp:  [12-22] 20  BP: ()/(49-93) 122/66  Body mass index is 35.85 kg/(m^2).    Intake/Output Summary (Last 24 hours) at 02/11/17 0835  Last data filed at 02/10/17 1545   Gross per 24 hour   Intake    920 ml   Output     20 ml   Net    900 ml             Physical Exam:   General: patient awake, alert and cooperative   Cardiovascular: regular rhythm and rate   Pulm: clear to auscultation bilaterally   Abdomen: Benign.  Soft bowel sounds   Extremities:   Good distal pulses no motor deficit.  Wound is dry. Neurologic: Normal mood and behavior     Results Review:     I reviewed the patient's new clinical results.      WBC No results found for: WBCS   HGB No results found for: HGB   HCT No results found for: HCT   Platlets No results found for: LABPLAT     PT/INR:  No results found for: PROTIME/No results found for: INR    Sodium No results found for: NA   Potassium No results found for: K   Chloride No results found for: CL   Bicarbonate No results found for: PLASMABICARB   BUN No results found for: BUN   Creatinine No results found for: CREATININE   Calcium No results found for: CALCIUM   Magnesium  AST  ALT  Bilirubin, Total  AlkPhos  Albumin    Amylase  Lipase    Radiology: No results found for: MG  No components found for: AST.*  No components found for: ALT.*  No components found for: BILIRUBIN, TOTAL.*    No components found for: ALKPHOS.*  No components found for: ALBUMIN.*      No components found for: AMYLASE.*  No components found for: LIPASE.*            Imaging Results (most recent)     Procedure Component Value Units Date/Time    FL C Arm During Surgery  [[id number]]      Updated:  02/10/17 7432                    Assessment/Plan     Patient Active Problem List   Diagnosis Code   • Patellar subluxation S83.003A      discharge home later this morning after PT is seen in she's had her second dose of IV antibiotics.  Patient will take 1 adult aspirin daily for DVT prophylaxis we'll prescribe pain medication be seen in the office in the next 7-10 days.        Saqib Salazar MD  02/11/17  8:35 AM

## 2017-02-11 NOTE — PLAN OF CARE
Problem: Patient Care Overview (Adult)  Goal: Plan of Care Review  Outcome: Ongoing (interventions implemented as appropriate)    02/11/17 1107   Coping/Psychosocial Response Interventions   Plan Of Care Reviewed With patient   Patient Care Overview   Progress improving   Outcome Evaluation   Outcome Summary/Follow up Plan Patient being discharged home       Goal: Adult Individualization and Mutuality  Outcome: Ongoing (interventions implemented as appropriate)    02/11/17 1107   Individualization   Patient Specific Goals Pain management   Patient Specific Interventions Meds given upon request       Goal: Discharge Needs Assessment  Outcome: Ongoing (interventions implemented as appropriate)    02/11/17 1107   Discharge Needs Assessment   Concerns To Be Addressed denies needs/concerns at this time         Problem: Perioperative Period (Adult)  Goal: Signs and Symptoms of Listed Potential Problems Will be Absent or Manageable (Perioperative Period)  Outcome: Ongoing (interventions implemented as appropriate)    02/11/17 1107   Perioperative Period   Problems Assessed (Perioperative Period) all   Problems Present (Perioperative Period) pain         Problem: Pain, Acute (Adult)  Goal: Identify Related Risk Factors and Signs and Symptoms  Outcome: Outcome(s) achieved Date Met:  02/11/17 02/11/17 1107   Pain, Acute   Related Risk Factors (Acute Pain) surgery   Signs and Symptoms (Acute Pain) verbalization of pain descriptors       Goal: Acceptable Pain Control/Comfort Level  Outcome: Ongoing (interventions implemented as appropriate)    02/11/17 1107   Pain, Acute (Adult)   Acceptable Pain Control/Comfort Level making progress toward outcome

## 2017-02-11 NOTE — THERAPY DISCHARGE NOTE
Acute Care - Occupational Therapy Initial Eval/Discharge   Helena Gonzalez     Patient Name: Bisi Bedoya  : 1995  MRN: [id number]  Today's Date: 2017  Onset of Illness/Injury or Date of Surgery Date: 02/10/17  Date of Referral to OT: 17  Referring Physician: Martin      Admit Date: 2/10/2017       ICD-10-CM ICD-9-CM   1. Patellar subluxation, left, sequela S83.002S 905.6   2. Pain R52 780.96     Patient Active Problem List   Diagnosis   • Patellar subluxation     Past Medical History   Diagnosis Date   • Anxiety    • Depression    • Migraine    • Recurrent left knee instability      sched patellafemoral junction repair     Past Surgical History   Procedure Laterality Date   • Tonsillectomy and adenoidectomy     • Patella surgery  02/10/2017          OT ASSESSMENT FLOWSHEET (last 72 hours)      OT Evaluation       17 1002 17 0820 02/10/17 1713 02/10/17 1710       Rehab Evaluation    Document Type  evaluation  -JJ       Subjective Information  agree to therapy;no complaints  -JJ       Patient Effort, Rehab Treatment  excellent  -JJ       General Information    Patient Profile Review  yes  -JJ       Onset of Illness/Injury or Date of Surgery Date  02/10/17  -JJ       Referring Physician  Martin  -JIVANA       General Observations  pt supine in bed, mom in room agreed to evaluation  -JJ       Pertinent History Of Current Problem  pt admitted for reconstruction of her medial retinaculum and VMO advancement due to chronically subluxing L patella  -JJ       Precautions/Limitations  fall precautions;non-weight bearing status  -JJ       Prior Level of Function  independent:;all household mobility;gait;transfer;ADL's  -JJ       Equipment Currently Used at Home  crutches   used occasionally when needed  -JJ  crutches  -PB     Plans/Goals Discussed With  patient and family;agreed upon  -JJ       Risks Reviewed  patient and family:;increased discomfort  -JJ       Benefits Reviewed  patient and  family:;improve function  -JJ       Barriers to Rehab  none identified  -JJ       Living Environment    Lives With  parent(s)   dad  -JJ parent(s)   dad  -PB      Living Arrangements  house  -JJ house  -PB      Stair Railings at Home   outside, present on right side  -PB      Type of Financial/Environmental Concern   none  -PB      Transportation Available   car  -PB      Clinical Impression    Date of Referral to OT  02/11/17  -       OT Diagnosis  decreased adl 2 to sp L knee sx  -JJ       Patient/Family Goals Statement  pt and mom state pt going home today  -       Criteria for Skilled Therapeutic Interventions Met  no;no problems identified which require skilled intervention  -       Therapy Frequency  evaluation only  -J       Anticipated Equipment Needs At Discharge  walker   standard walker  -       Anticipated Discharge Disposition home with assist  - home with assist  -       Functional Level Prior    Ambulation    1-->assistive equipment  -PB     Transferring    1-->assistive equipment  -PB     Toileting    0-->independent  -PB     Bathing    0-->independent  -PB     Dressing    0-->independent  -PB     Eating    0-->independent  -PB     Communication    0-->understands/communicates without difficulty  -PB     Swallowing    0-->swallows foods/liquids without difficulty  -PB     Pain Assessment    Pain Assessment  No/denies pain  -JJ       ROM (Range of Motion)    General ROM Detail  B UE AROM WFL  -JJ       MMT (Manual Muscle Testing)    General MMT Assessment Detail  B UE MMT WFL  -       Bed Mobility, Assessment/Treatment    Bed Mob, Supine to Sit, [place]  contact guard assist  -JJ       Transfer Assessment/Treatment    Transfers, Sit-Stand [place]  stand by assist;verbal cues required  -JJ       Transfers, Stand-Sit [place]  stand by assist;verbal cues required  -JJ       Transfers, Sit-Stand-Sit, Assist Device  rolling walker  -       Functional Mobility    Functional  Mobility- Ind. Level  supervision required  -       Functional Mobility- Device  rolling walker  -       Functional Mobility-Distance (Feet)  --   in room  -       Lower Body Bathing Assessment/Training    LB Bathing Assess/Train, Comment  --   discussed with pt and mom may require assist with LB adls  -       Sensory Assessment/Intervention    Sensory Impairment  --   pt states L LE still numb from sx  -JJ       Positioning and Restraints    Pre-Treatment Position  in bed  -       Post Treatment Position  chair  -       In Chair  reclined;call light within reach;encouraged to call for assist;with family/caregiver  -         User Key  (r) = Recorded By, (t) = Taken By, (c) = Cosigned By    Initials Name Effective Dates    PB Zahra Lema RN 06/16/16 -     J Luiza Dwyer, OTR 06/22/16 -           Occupational Therapy Education     Title: PT OT SLP Therapies (Resolved)     Topic: Occupational Therapy (Resolved)     Point: ADL training (Resolved)    Description: Instruct learner(s) on proper safety adaptation and remediation techniques during self care or transfers.   Instruct in proper use of assistive devices.    Learning Progress Summary    Learner Readiness Method Response Comment Documented by Status   Patient Acceptance E VU pt educated on safety with functional transfers and adls  02/11/17 0958 Done   Family Acceptance E VU pt educated on safety with functional transfers and adls  02/11/17 0958 Done               Point: Precautions (Resolved)    Description: Instruct learner(s) on prescribed precautions during self-care and functional transfers.    Learning Progress Summary    Learner Readiness Method Response Comment Documented by Status   Patient Acceptance E VU pt educated on safety with functional transfers and adls  02/11/17 0958 Done   Family Acceptance E VU pt educated on safety with functional transfers and adls  02/11/17 0958 Done                      User Key      Initials Effective Dates Name Provider Type Discipline     06/22/16 -  MATTIE Banda Occupational Therapist OT                OT Recommendation and Plan  Anticipated Equipment Needs At Discharge: walker (standard walker)  Anticipated Discharge Disposition: home with assist  Therapy Frequency: evaluation only  Plan of Care Review  Plan Of Care Reviewed With: patient  Outcome Summary/Follow up Plan: OT evaluation completed. pt is sba for functional transfers with rolling walker. discussed with pt and mom pt may require assist with adl setup and LB adls while in LE cast. mom states pt has assist available at home. no other OT concerns at this time. pt being discharged from facility today per surgeon.               Outcome Measures       02/11/17 0820          How much help from another is currently needed...    Putting on and taking off regular lower body clothing? 3  -JJ      Bathing (including washing, rinsing, and drying) 3  -JJ      Toileting (which includes using toilet bed pan or urinal) 4  -JJ      Putting on and taking off regular upper body clothing 4  -JJ      Taking care of personal grooming (such as brushing teeth) 4  -JJ      Eating meals 4  -JJ      Score 22  -JJ      Functional Assessment    Outcome Measure Options AM-PAC 6 Clicks Daily Activity (OT)  -JJ        User Key  (r) = Recorded By, (t) = Taken By, (c) = Cosigned By    Initials Name Provider Type     MATTIE Banda Occupational Therapist          Time Calculation:       Therapy Charges for Today     Code Description Service Date Service Provider Modifiers Qty    84028518060 HC OT SELFCARE CURRENT 2/11/2017 MATTIE Banda CJ 1    66410112451 HC OT SELFCARE PROJECTED 2/11/2017 MATTIE Banda CJ 1    11783671356 HC OT SELFCARE DISCHARGE 2/11/2017 MATTIE Banda CJ 1    32639180970 HC OT EVAL LOW COMPLEXITY 1 2/11/2017 MATTIE Banda 1          OT G-codes  OT  Professional Judgement Used?: Yes  OT Functional Scales Options: AM-PAC 6 Clicks Daily Activity (OT)  Score: 22  Functional Limitation: Self care  Self Care Current Status (): At least 20 percent but less than 40 percent impaired, limited or restricted  Self Care Goal Status (): At least 20 percent but less than 40 percent impaired, limited or restricted  Self Care Discharge Status (): At least 20 percent but less than 40 percent impaired, limited or restricted    OT Discharge Summary  Anticipated Discharge Disposition: home with assist    Luiza Dwyer, OTR  2/11/2017

## 2017-02-20 ENCOUNTER — OFFICE VISIT (OUTPATIENT)
Dept: ORTHOPEDIC SURGERY | Facility: CLINIC | Age: 22
End: 2017-02-20

## 2017-02-20 DIAGNOSIS — S83.002S PATELLAR SUBLUXATION, LEFT, SEQUELA: Primary | ICD-10-CM

## 2017-02-20 DIAGNOSIS — Z09 STATUS POST ORTHOPEDIC SURGERY, FOLLOW-UP EXAM: ICD-10-CM

## 2017-02-20 PROCEDURE — 99024 POSTOP FOLLOW-UP VISIT: CPT | Performed by: ORTHOPAEDIC SURGERY

## 2017-02-20 NOTE — PROGRESS NOTES
Subjective: Status post medial retinacular patellar repair and VMO advancement left knee     Patient ID: Bisi Bedoya is a 21 y.o. female.    Chief Complaint:    History of Present Illness patient is 10 days out is doing well.  Pain well-controlled oral medication.  Ambulating nonweightbearing with her walker       Social History     Occupational History   • Not on file.     Social History Main Topics   • Smoking status: Never Smoker   • Smokeless tobacco: Never Used   • Alcohol use No   • Drug use: No   • Sexual activity: Defer      Review of Systems   Constitutional: Negative for chills, diaphoresis, fever and unexpected weight change.   HENT: Negative for hearing loss, nosebleeds, sore throat and tinnitus.    Eyes: Negative for pain and visual disturbance.   Respiratory: Negative for cough, shortness of breath and wheezing.    Cardiovascular: Negative for chest pain and palpitations.   Gastrointestinal: Negative for abdominal pain, diarrhea, nausea and vomiting.   Endocrine: Negative for cold intolerance, heat intolerance and polydipsia.   Genitourinary: Negative for difficulty urinating, dysuria and hematuria.   Musculoskeletal: Negative for arthralgias, joint swelling and myalgias.   Skin: Negative for rash and wound.   Allergic/Immunologic: Negative for environmental allergies.   Neurological: Negative for dizziness, syncope and numbness.   Hematological: Does not bruise/bleed easily.   Psychiatric/Behavioral: Negative for dysphoric mood and sleep disturbance. The patient is not nervous/anxious.          Past Medical History   Diagnosis Date   • Anxiety    • Depression    • Migraine    • Recurrent left knee instability      sched patellafemoral junction repair     Past Surgical History   Procedure Laterality Date   • Tonsillectomy and adenoidectomy     • Patella surgery  02/10/2017   • Patellofemoral junction repair Left 2/10/2017     Procedure: PATELLOFEMORAL JUNCTION REPAIR with VMO advancement and  medial retinacular reconstruction;  Surgeon: Saqib Salazar MD;  Location: [facility];  Service:      Family History   Problem Relation Age of Onset   • Diabetes Father          Objective:  There were no vitals filed for this visit.  There were no vitals filed for this visit.  There is no height or weight on file to calculate BMI.       Ortho Exam  cast was windowed wound is completely benign.    Assessment:       1. Patellar subluxation, left, sequela    2. Status post orthopedic surgery, follow-up exam          Plan:      she is to remain nonweightbearing.  Return in 3 weeks with x-rays out of the cast AP lateral sunrise view and we'll then begin physical therapy.      Work Status:    Meld[employer] query complete.    Orders:  No orders of the defined types were placed in this encounter.      Medications:  No orders of the defined types were placed in this encounter.      Followup:  Return in about 3 weeks (around 3/13/2017).          Dragon transcription disclaimer     Much of this encounter note is an electronic transcription/translation of spoken language to printed text. The electronic translation of spoken language may permit erroneous, or at times, nonsensical words or phrases to be inadvertently transcribed. Although I have reviewed the note for such errors, some may still exist.

## 2017-03-13 ENCOUNTER — OFFICE VISIT (OUTPATIENT)
Dept: ORTHOPEDIC SURGERY | Facility: CLINIC | Age: 22
End: 2017-03-13

## 2017-03-13 DIAGNOSIS — S83.002D PATELLAR SUBLUXATION, LEFT, SUBSEQUENT ENCOUNTER: Primary | ICD-10-CM

## 2017-03-13 DIAGNOSIS — Z09 STATUS POST ORTHOPEDIC SURGERY, FOLLOW-UP EXAM: ICD-10-CM

## 2017-03-13 PROCEDURE — 99024 POSTOP FOLLOW-UP VISIT: CPT | Performed by: ORTHOPAEDIC SURGERY

## 2017-03-13 PROCEDURE — 73562 X-RAY EXAM OF KNEE 3: CPT | Performed by: ORTHOPAEDIC SURGERY

## 2017-03-13 RX ORDER — HYDROCODONE BITARTRATE AND ACETAMINOPHEN 7.5; 325 MG/1; MG/1
1 TABLET ORAL EVERY 6 HOURS PRN
Qty: 40 TABLET | Refills: 0 | Status: SHIPPED | OUTPATIENT
Start: 2017-03-13 | End: 2017-08-16

## 2017-03-13 NOTE — PROGRESS NOTES
Subjective: Subluxing left patella     Patient ID: Bisi Bedoya is a 21 y.o. female.    Chief Complaint:    History of Present Illness patient approximate 4 weeks status post medial retinacular repair with VMO advancement.  She is doing well moderate pain as expected.       Social History     Occupational History   • Not on file.     Social History Main Topics   • Smoking status: Never Smoker   • Smokeless tobacco: Never Used   • Alcohol use No   • Drug use: No   • Sexual activity: Defer      Review of Systems   Constitutional: Negative for chills, diaphoresis, fever and unexpected weight change.   HENT: Negative for hearing loss, nosebleeds, sore throat and tinnitus.    Eyes: Negative for pain and visual disturbance.   Respiratory: Negative for cough, shortness of breath and wheezing.    Cardiovascular: Negative for chest pain and palpitations.   Gastrointestinal: Negative for abdominal pain, diarrhea, nausea and vomiting.   Endocrine: Negative for cold intolerance, heat intolerance and polydipsia.   Genitourinary: Negative for difficulty urinating, dysuria and hematuria.   Musculoskeletal: Negative for arthralgias, joint swelling and myalgias.   Skin: Negative for rash and wound.   Allergic/Immunologic: Negative for environmental allergies.   Neurological: Negative for dizziness, syncope and numbness.   Hematological: Does not bruise/bleed easily.   Psychiatric/Behavioral: Negative for dysphoric mood and sleep disturbance. The patient is not nervous/anxious.    All other systems reviewed and are negative.        Past Medical History   Diagnosis Date   • Anxiety    • Depression    • Migraine    • Recurrent left knee instability      sched patellafemoral junction repair     Past Surgical History   Procedure Laterality Date   • Tonsillectomy and adenoidectomy     • Patella surgery  02/10/2017   • Patellofemoral junction repair Left 2/10/2017     Procedure: PATELLOFEMORAL JUNCTION REPAIR with VMO advancement  and medial retinacular reconstruction;  Surgeon: Saqib Salazar MD;  Location: [facility];  Service:      Family History   Problem Relation Age of Onset   • Diabetes Father          Objective:  There were no vitals filed for this visit.  There were no vitals filed for this visit.  There is no height or weight on file to calculate BMI.       Ortho Exam  AP and lateral and sunrise view of the knee shows good position of the patella.  No prior x-rays for comparison postoperatively.  Wound is completely benign.  The cast was removed and she is placed in a hinge brace with the knee locked at 30°.    Assessment:       1. Patellar subluxation, left, subsequent encounter    2. Status post orthopedic surgery, follow-up exam          Plan:      he is to continue nonweightbearing she can begin though range of motion with heat application to the knee.  Return to see me in 2 weeks to begin physical therapy.      Work Status:    [employer] query complete.    Orders:  Orders Placed This Encounter   Procedures   • XR Knee 3 View Left       Medications:  New Medications Ordered This Visit   Medications   • HYDROcodone-acetaminophen (NORCO) 7.5-325 MG per tablet     Sig: Take 1 tablet by mouth Every 6 (Six) Hours As Needed for moderate pain (4-6).     Dispense:  40 tablet     Refill:  0       Followup:  Return in about 2 weeks (around 3/27/2017).          Dragon transcription disclaimer     Much of this encounter note is an electronic transcription/translation of spoken language to printed text. The electronic translation of spoken language may permit erroneous, or at times, nonsensical words or phrases to be inadvertently transcribed. Although I have reviewed the note for such errors, some may still exist.

## 2017-03-23 ENCOUNTER — OFFICE VISIT (OUTPATIENT)
Dept: ORTHOPEDIC SURGERY | Facility: CLINIC | Age: 22
End: 2017-03-23

## 2017-03-23 DIAGNOSIS — S83.002D PATELLAR SUBLUXATION, LEFT, SUBSEQUENT ENCOUNTER: Primary | ICD-10-CM

## 2017-03-23 DIAGNOSIS — Z09 STATUS POST ORTHOPEDIC SURGERY, FOLLOW-UP EXAM: ICD-10-CM

## 2017-03-23 PROCEDURE — 99024 POSTOP FOLLOW-UP VISIT: CPT | Performed by: ORTHOPAEDIC SURGERY

## 2017-03-23 NOTE — PROGRESS NOTES
Subjective: Status post left patella reconstruction     Patient ID: Bisi Bedoya is a 21 y.o. female.    Chief Complaint:    History of Present Illness patient returned early she's concerned about some bleeding from her wound but examination shows she has a significant contact dermatitis of the leg the wound itself is completely benign.  Where she was showing some bleeding is just the stitch abscess is no sign of infection whatsoever.       Social History     Occupational History   • Not on file.     Social History Main Topics   • Smoking status: Never Smoker   • Smokeless tobacco: Never Used   • Alcohol use No   • Drug use: No   • Sexual activity: Defer      Review of Systems   Constitutional: Negative for chills, diaphoresis, fever and unexpected weight change.   HENT: Negative for hearing loss, nosebleeds, sore throat and tinnitus.    Eyes: Negative for pain and visual disturbance.   Respiratory: Negative for cough, shortness of breath and wheezing.    Cardiovascular: Negative for chest pain and palpitations.   Gastrointestinal: Negative for abdominal pain, diarrhea, nausea and vomiting.   Endocrine: Negative for cold intolerance, heat intolerance and polydipsia.   Genitourinary: Negative for difficulty urinating, dysuria and hematuria.   Musculoskeletal: Negative for arthralgias, joint swelling and myalgias.   Skin: Negative for rash and wound.   Allergic/Immunologic: Negative for environmental allergies.   Neurological: Negative for dizziness, syncope and numbness.   Hematological: Does not bruise/bleed easily.   Psychiatric/Behavioral: Negative for dysphoric mood and sleep disturbance. The patient is not nervous/anxious.          Past Medical History:   Diagnosis Date   • Anxiety    • Depression    • Migraine    • Recurrent left knee instability     sched patellafemoral junction repair     Past Surgical History:   Procedure Laterality Date   • PATELLA SURGERY  02/10/2017   • PATELLOFEMORAL JUNCTION  REPAIR Left 2/10/2017    Procedure: PATELLOFEMORAL JUNCTION REPAIR with VMO advancement and medial retinacular reconstruction;  Surgeon: Saqib Salazar MD;  Location: [facility];  Service:    • TONSILLECTOMY AND ADENOIDECTOMY       Family History   Problem Relation Age of Onset   • Diabetes Father          Objective:  There were no vitals filed for this visit.  There were no vitals filed for this visit.  There is no height or weight on file to calculate BMI.       Ortho Exam  I put a soft sleeve over the wounds and she is not getting contact with the brace with the vas causing the rash on not sure she states she had some of the limited cast was removed but it certainly looks like contact dermatitis.  I did advise him on Benadryl for the itching.  Again the wound is benign with no sign of infection    Assessment:     No diagnosis found.      Plan:      told to contact her PCP ASAP regarding treatment for the contact dermatitis.  Keep scheduled appointment with me on Monday.  She is given AP lateral and sunrise view as much as she can flex the knee on return      Work Status:    [employer] query complete.    Orders:  No orders of the defined types were placed in this encounter.      Medications:  No orders of the defined types were placed in this encounter.      Followup:  No Follow-up on file.          Dragon transcription disclaimer     Much of this encounter note is an electronic transcription/translation of spoken language to printed text. The electronic translation of spoken language may permit erroneous, or at times, nonsensical words or phrases to be inadvertently transcribed. Although I have reviewed the note for such errors, some may still exist.

## 2017-03-27 ENCOUNTER — OFFICE VISIT (OUTPATIENT)
Dept: ORTHOPEDIC SURGERY | Facility: CLINIC | Age: 22
End: 2017-03-27

## 2017-03-27 DIAGNOSIS — Z09 STATUS POST ORTHOPEDIC SURGERY, FOLLOW-UP EXAM: ICD-10-CM

## 2017-03-27 DIAGNOSIS — R52 PAIN: Primary | ICD-10-CM

## 2017-03-27 DIAGNOSIS — S83.002D PATELLAR SUBLUXATION, LEFT, SUBSEQUENT ENCOUNTER: ICD-10-CM

## 2017-03-27 PROCEDURE — 73562 X-RAY EXAM OF KNEE 3: CPT | Performed by: ORTHOPAEDIC SURGERY

## 2017-03-27 PROCEDURE — 99024 POSTOP FOLLOW-UP VISIT: CPT | Performed by: ORTHOPAEDIC SURGERY

## 2017-03-27 NOTE — PROGRESS NOTES
Subjective: Status post left patella reconstruction     Patient ID: Bisi Bedoya is a 21 y.o. female.    Chief Complaint:    History of Present Illness patient is 6 weeks status post medial retinacular repair and VMO advancement of the left knee.  She is doing better.  Saw her PCP regarding the rash and is on treatment that is also improving.  She is having just mild pain.       Social History     Occupational History   • Not on file.     Social History Main Topics   • Smoking status: Never Smoker   • Smokeless tobacco: Never Used   • Alcohol use No   • Drug use: No   • Sexual activity: Defer      Review of Systems   Constitutional: Negative for chills, diaphoresis, fever and unexpected weight change.   HENT: Negative for hearing loss, nosebleeds, sore throat and tinnitus.    Eyes: Negative for pain and visual disturbance.   Respiratory: Negative for cough, shortness of breath and wheezing.    Cardiovascular: Negative for chest pain and palpitations.   Gastrointestinal: Negative for abdominal pain, diarrhea, nausea and vomiting.   Endocrine: Negative for cold intolerance, heat intolerance and polydipsia.   Genitourinary: Negative for difficulty urinating, dysuria and hematuria.   Musculoskeletal: Negative for arthralgias, joint swelling and myalgias.   Skin: Negative for rash and wound.   Allergic/Immunologic: Negative for environmental allergies.   Neurological: Negative for dizziness, syncope and numbness.   Hematological: Does not bruise/bleed easily.   Psychiatric/Behavioral: Negative for dysphoric mood and sleep disturbance. The patient is not nervous/anxious.    All other systems reviewed and are negative.        Past Medical History:   Diagnosis Date   • Anxiety    • Depression    • Migraine    • Recurrent left knee instability     sched patellafemoral junction repair     Past Surgical History:   Procedure Laterality Date   • PATELLA SURGERY  02/10/2017   • PATELLOFEMORAL JUNCTION REPAIR Left 2/10/2017     Procedure: PATELLOFEMORAL JUNCTION REPAIR with VMO advancement and medial retinacular reconstruction;  Surgeon: Saqib Salazar MD;  Location: [facility];  Service:    • TONSILLECTOMY AND ADENOIDECTOMY       Family History   Problem Relation Age of Onset   • Diabetes Father          Objective:  There were no vitals filed for this visit.  There were no vitals filed for this visit.  There is no height or weight on file to calculate BMI.       Ortho Exam  her incision is completely benign with no drainage.  Erythema is improving from the contact dermatitis with treatment.  AP lateral sunrise view of her left knee shows perfect position of the left patella.  It is approved this position compared to preoperative x-rays.  She has no motor deficit good distal pulses.  His no swelling noted to the knee.  There is no motor deficit good distal pulses.  No calf tenderness.  No paresthesia.    Assessment:       1. Pain    2. Patellar subluxation, left, subsequent encounter    3. Status post orthopedic surgery, follow-up exam          Plan:      she can begin weightbearing as tolerated in the brace.  Begin physical therapy.  Return to see me in 4 weeks in a repeat x-ray sunrise view only on return.      Work Status:    [employer]ICE query complete.    Orders:  Orders Placed This Encounter   Procedures   • XR Knee 3 View Left   • Ambulatory Referral to Physical Therapy Evaluate and treat       Medications:  No orders of the defined types were placed in this encounter.      Followup:  Return in about 4 weeks (around 4/24/2017).          Dragon transcription disclaimer     Much of this encounter note is an electronic transcription/translation of spoken language to printed text. The electronic translation of spoken language may permit erroneous, or at times, nonsensical words or phrases to be inadvertently transcribed. Although I have reviewed the note for such errors, some may still exist.

## 2017-04-26 ENCOUNTER — OFFICE VISIT (OUTPATIENT)
Dept: ORTHOPEDIC SURGERY | Facility: CLINIC | Age: 22
End: 2017-04-26

## 2017-04-26 DIAGNOSIS — Z09 STATUS POST ORTHOPEDIC SURGERY, FOLLOW-UP EXAM: ICD-10-CM

## 2017-04-26 DIAGNOSIS — S83.002D PATELLAR SUBLUXATION, LEFT, SUBSEQUENT ENCOUNTER: ICD-10-CM

## 2017-04-26 DIAGNOSIS — R52 PAIN: Primary | ICD-10-CM

## 2017-04-26 PROCEDURE — 99024 POSTOP FOLLOW-UP VISIT: CPT | Performed by: ORTHOPAEDIC SURGERY

## 2017-04-26 PROCEDURE — 73562 X-RAY EXAM OF KNEE 3: CPT | Performed by: ORTHOPAEDIC SURGERY

## 2017-04-26 NOTE — PROGRESS NOTES
Subjective:status post medial retinacular repair and VMO advancement left patella     Patient ID: Bisi Bedoya is a 21 y.o. female.    Chief Complaint:    History of Present Illness patient is 10 weeks status post above stated procedure and she is doing well.  She is ambulating independently without a cane or crutch or brace having minimal to no pain.  She just started physical therapy.       Social History     Occupational History   • Not on file.     Social History Main Topics   • Smoking status: Never Smoker   • Smokeless tobacco: Never Used   • Alcohol use No   • Drug use: No   • Sexual activity: Defer      Review of Systems   Constitutional: Negative for chills, diaphoresis, fever and unexpected weight change.   HENT: Negative for hearing loss, nosebleeds, sore throat and tinnitus.    Eyes: Negative for pain and visual disturbance.   Respiratory: Negative for cough, shortness of breath and wheezing.    Cardiovascular: Negative for chest pain and palpitations.   Gastrointestinal: Negative for abdominal pain, diarrhea, nausea and vomiting.   Endocrine: Negative for cold intolerance, heat intolerance and polydipsia.   Genitourinary: Negative for difficulty urinating, dysuria and hematuria.   Musculoskeletal: Negative for arthralgias, joint swelling and myalgias.   Skin: Negative for rash and wound.   Allergic/Immunologic: Negative for environmental allergies.   Neurological: Negative for dizziness, syncope and numbness.   Hematological: Does not bruise/bleed easily.   Psychiatric/Behavioral: Negative for dysphoric mood and sleep disturbance. The patient is not nervous/anxious.    All other systems reviewed and are negative.        Past Medical History:   Diagnosis Date   • Anxiety    • Depression    • Migraine    • Recurrent left knee instability     sched patellafemoral junction repair     Past Surgical History:   Procedure Laterality Date   • PATELLA SURGERY  02/10/2017   • PATELLOFEMORAL JUNCTION REPAIR  Left 2/10/2017    Procedure: PATELLOFEMORAL JUNCTION REPAIR with VMO advancement and medial retinacular reconstruction;  Surgeon: Saqib Salazar MD;  Location: [facility];  Service:    • TONSILLECTOMY AND ADENOIDECTOMY       Family History   Problem Relation Age of Onset   • Diabetes Father          Objective:  There were no vitals filed for this visit.  There were no vitals filed for this visit.  There is no height or weight on file to calculate BMI.       Ortho Exam    He sunrise view of the left knee to evaluate her postop recovery shows excellent position and centering of the patella.  On exam she is alert and oriented ×3.  Examination of the left leg shows no swelling effusion erythema.  Calf is non-tender.  His good distal pulses no motor deficit.  No sensory loss.  No ecchymosis is noted.  She has full extension and flexion past 100° with central positioning of the patella throughout the arc of motion with.  Negative medial apprehension sign.  No crepitus noted.  Her VMO probably still at best +3 out of 5.  She has been taking an over-the-counter nonsteroidal with no GI side effects.    Assessment:       1. Pain    2. Patellar subluxation, left, subsequent encounter    3. Status post orthopedic surgery, follow-up exam          Plan:        Discussed with the patient and family member the critical part is recovering now is her therapy and exercise program to strengthen her VMO.  I told her this may take a full year to fully recover and are reviewed and exercise program with her as far strengthening exercises continue physical therapy return to see me in a month no x-ray necessary      Work Status:    DENICE query complete.    Orders:  Orders Placed This Encounter   Procedures   • XR Knee 3 View Left       Medications:  No orders of the defined types were placed in this encounter.      Followup:  Return in about 4 weeks (around 5/24/2017).          Dragon transcription disclaimer     Much of this encounter note  is an electronic transcription/translation of spoken language to printed text. The electronic translation of spoken language may permit erroneous, or at times, nonsensical words or phrases to be inadvertently transcribed. Although I have reviewed the note for such errors, some may still exist.

## 2017-06-08 ENCOUNTER — OFFICE VISIT (OUTPATIENT)
Dept: ORTHOPEDIC SURGERY | Facility: CLINIC | Age: 22
End: 2017-06-08

## 2017-06-08 DIAGNOSIS — R52 PAIN: Primary | ICD-10-CM

## 2017-06-08 DIAGNOSIS — S83.002D PATELLAR SUBLUXATION, LEFT, SUBSEQUENT ENCOUNTER: ICD-10-CM

## 2017-06-08 DIAGNOSIS — Z09 STATUS POST ORTHOPEDIC SURGERY, FOLLOW-UP EXAM: ICD-10-CM

## 2017-06-08 PROCEDURE — 99213 OFFICE O/P EST LOW 20 MIN: CPT | Performed by: ORTHOPAEDIC SURGERY

## 2017-06-08 NOTE — PROGRESS NOTES
Subjective: Patellar soft tissue reconstruction     Patient ID: Bisi Bedoya is a 21 y.o. female.    Chief Complaint:    History of Present Illness patient is 4 months status post a retinacular repair and VMO advancement for subluxing left patella continues to do well.  Still having some discomfort in the knee particular she states that she does squats and does feel some popping in the knee but is overall much better.  She has been doing her strengthening exercises       Social History     Occupational History   • Not on file.     Social History Main Topics   • Smoking status: Never Smoker   • Smokeless tobacco: Never Used   • Alcohol use No   • Drug use: No   • Sexual activity: Defer      Review of Systems   Constitutional: Negative for chills, diaphoresis, fever and unexpected weight change.   HENT: Negative for hearing loss, nosebleeds, sore throat and tinnitus.    Eyes: Negative for pain and visual disturbance.   Respiratory: Negative for cough, shortness of breath and wheezing.    Cardiovascular: Negative for chest pain and palpitations.   Gastrointestinal: Negative for abdominal pain, diarrhea, nausea and vomiting.   Endocrine: Negative for cold intolerance, heat intolerance and polydipsia.   Genitourinary: Negative for difficulty urinating, dysuria and hematuria.   Musculoskeletal: Negative for arthralgias, joint swelling and myalgias.   Skin: Negative for rash and wound.   Allergic/Immunologic: Negative for environmental allergies.   Neurological: Negative for dizziness, syncope and numbness.   Hematological: Does not bruise/bleed easily.   Psychiatric/Behavioral: Negative for dysphoric mood and sleep disturbance. The patient is not nervous/anxious.          Past Medical History:   Diagnosis Date   • Anxiety    • Depression    • Migraine    • Recurrent left knee instability     sched patellafemoral junction repair     Past Surgical History:   Procedure Laterality Date   • PATELLA SURGERY  02/10/2017    • PATELLOFEMORAL JUNCTION REPAIR Left 2/10/2017    Procedure: PATELLOFEMORAL JUNCTION REPAIR with VMO advancement and medial retinacular reconstruction;  Surgeon: Saqib Salazar MD;  Location: [facility];  Service:    • TONSILLECTOMY AND ADENOIDECTOMY       Family History   Problem Relation Age of Onset   • Diabetes Father          Objective:  There were no vitals filed for this visit.  There were no vitals filed for this visit.  There is no height or weight on file to calculate BMI.       Ortho Exam  she is alert and oriented ×3.  Exam shows no effusion swelling erythema to the knee.  The skin is cool to touch there is no effusion or swelling.  No motor deficit good distal pulses no sensory loss.  There is normal patellar tracking with range of motion.  No crepitus noted.  VMO is still weak on testing.  Her calf is nontender.    Assessment:       1. Pain    2. Patellar subluxation, left, subsequent encounter    3. Status post orthopedic surgery, follow-up exam          Plan:      as previously explained to the patient and the family she needs to continue working aggressively in his strengthening exercises 1 over program once again with her using free weights strengthening the VMO.  Again I reiterated that this can take probably year to fully resolve.  Continue the strengthening program continue PT as long as insurance will cover it return to see me in 2 months      Work Status:    DENICE query complete.    Orders:  No orders of the defined types were placed in this encounter.      Medications:  No orders of the defined types were placed in this encounter.      Followup:  Return in about 2 months (around 8/8/2017).          Dragon transcription disclaimer     Much of this encounter note is an electronic transcription/translation of spoken language to printed text. The electronic translation of spoken language may permit erroneous, or at times, nonsensical words or phrases to be inadvertently transcribed. Although I  have reviewed the note for such errors, some may still exist.

## 2017-08-07 ENCOUNTER — OFFICE VISIT (OUTPATIENT)
Dept: ORTHOPEDIC SURGERY | Facility: CLINIC | Age: 22
End: 2017-08-07

## 2017-08-07 VITALS — WEIGHT: 205 LBS | HEIGHT: 61 IN | BODY MASS INDEX: 38.71 KG/M2

## 2017-08-07 DIAGNOSIS — S83.002D PATELLAR SUBLUXATION, LEFT, SUBSEQUENT ENCOUNTER: ICD-10-CM

## 2017-08-07 DIAGNOSIS — Z09 STATUS POST ORTHOPEDIC SURGERY, FOLLOW-UP EXAM: ICD-10-CM

## 2017-08-07 DIAGNOSIS — R52 PAIN: Primary | ICD-10-CM

## 2017-08-07 PROCEDURE — 99213 OFFICE O/P EST LOW 20 MIN: CPT | Performed by: ORTHOPAEDIC SURGERY

## 2017-08-07 PROCEDURE — 73560 X-RAY EXAM OF KNEE 1 OR 2: CPT | Performed by: ORTHOPAEDIC SURGERY

## 2017-08-07 RX ORDER — MELOXICAM 7.5 MG/1
7.5 TABLET ORAL DAILY
Qty: 30 TABLET | Refills: 5 | Status: SHIPPED | OUTPATIENT
Start: 2017-08-07 | End: 2018-01-28 | Stop reason: SDUPTHER

## 2017-08-07 NOTE — PROGRESS NOTES
Subjective: Status post patellar retinaculum reconstruction     Patient ID: Bisi Bedoya is a 21 y.o. female.    Chief Complaint:    History of Present Illness patient is 6 months status post surgery to the left knee is making progress obvious slow.  Has mild soreness on the medial aspect of the knee but otherwise is doing well she states she is doing better.  Although she cannot run or do other vigorous activity her preoperative symptoms are resolving albeit slow.       Social History     Occupational History   • Not on file.     Social History Main Topics   • Smoking status: Never Smoker   • Smokeless tobacco: Never Used   • Alcohol use No   • Drug use: No   • Sexual activity: Defer      Review of Systems   Constitutional: Negative for chills, diaphoresis, fever and unexpected weight change.   HENT: Negative for hearing loss, nosebleeds, sore throat and tinnitus.    Eyes: Negative for pain and visual disturbance.   Respiratory: Negative for cough, shortness of breath and wheezing.    Cardiovascular: Negative for chest pain and palpitations.   Gastrointestinal: Negative for abdominal pain, diarrhea, nausea and vomiting.   Endocrine: Negative for cold intolerance, heat intolerance and polydipsia.   Genitourinary: Negative for difficulty urinating, dysuria and hematuria.   Musculoskeletal: Positive for myalgias. Negative for arthralgias and joint swelling.   Skin: Negative for rash and wound.   Allergic/Immunologic: Negative for environmental allergies.   Neurological: Negative for dizziness, syncope and numbness.   Hematological: Does not bruise/bleed easily.   Psychiatric/Behavioral: Negative for dysphoric mood and sleep disturbance. The patient is not nervous/anxious.          Past Medical History:   Diagnosis Date   • Anxiety    • Depression    • Migraine    • Recurrent left knee instability     sched patellafemoral junction repair     Past Surgical History:   Procedure Laterality Date   • PATELLA SURGERY   02/10/2017   • PATELLOFEMORAL JUNCTION REPAIR Left 2/10/2017    Procedure: PATELLOFEMORAL JUNCTION REPAIR with VMO advancement and medial retinacular reconstruction;  Surgeon: Saqib Salazar MD;  Location: [facility] OR;  Service:    • TONSILLECTOMY AND ADENOIDECTOMY       Family History   Problem Relation Age of Onset   • Diabetes Father          Objective:  There were no vitals filed for this visit.  Last 3 weights    08/07/17  1053   Weight: 205 lb (93 kg)     Body mass index is 38.73 kg/(m^2).       Ortho Exam   the sunrise view of the knee shows the patella well located in the trochlear groove.  She is alert and oriented ×3.  Exam shows no swelling effusion erythema to the knee.  There is mild tenderness along the medial retinaculum and no subluxation.  Skin is cool to touch.  His no motor deficit good distal pulses.  Calf is nontender negative Homans.  Quad function is probably +4 out of 5.  She is not on any anti-inflammatory this time.    Assessment:       1. Pain    2. Patellar subluxation, left, subsequent encounter    3. Status post orthopedic surgery, follow-up exam          Plan:      she is making progress with therapy she needs to continue working on strengthening program and think she really needs to get a quad function and hamstring function significantly improved where she can do running-type activities.  I will begin a nonsteroidal anti-inflammatory to help resolve some of the medial soreness.  Return to see me in 2 weeks.      Work Status:    [employer] query complete.    Orders:  Orders Placed This Encounter   Procedures   • XR Knee 1 or 2 View Left       Medications:  New Medications Ordered This Visit   Medications   • meloxicam (MOBIC) 7.5 MG tablet     Sig: Take 1 tablet by mouth Daily.     Dispense:  30 tablet     Refill:  5       Followup:  Return in about 2 months (around 10/7/2017).          Dragon transcription disclaimer     Much of this encounter note is an electronic transcription/translation  of spoken language to printed text. The electronic translation of spoken language may permit erroneous, or at times, nonsensical words or phrases to be inadvertently transcribed. Although I have reviewed the note for such errors, some may still exist.

## 2017-08-16 ENCOUNTER — OFFICE VISIT (OUTPATIENT)
Dept: ORTHOPEDIC SURGERY | Facility: CLINIC | Age: 22
End: 2017-08-16

## 2017-08-16 DIAGNOSIS — Z09 STATUS POST ORTHOPEDIC SURGERY, FOLLOW-UP EXAM: ICD-10-CM

## 2017-08-16 DIAGNOSIS — S83.002D PATELLAR SUBLUXATION, LEFT, SUBSEQUENT ENCOUNTER: ICD-10-CM

## 2017-08-16 DIAGNOSIS — S80.02XA CONTUSION OF LEFT KNEE, INITIAL ENCOUNTER: ICD-10-CM

## 2017-08-16 DIAGNOSIS — R52 PAIN: Primary | ICD-10-CM

## 2017-08-16 PROCEDURE — 99214 OFFICE O/P EST MOD 30 MIN: CPT | Performed by: ORTHOPAEDIC SURGERY

## 2017-08-16 PROCEDURE — 73562 X-RAY EXAM OF KNEE 3: CPT | Performed by: ORTHOPAEDIC SURGERY

## 2017-08-16 NOTE — PROGRESS NOTES
Subjective: Left knee pain     Patient ID: Bisi Bedoya is a 22 y.o. female.    Chief Complaint:    History of Present Illness 22-year-old female is 6 months status post the patella retinacular reconstruction for subluxing patella was doing well to this past Saturday when she tripped and fell on her left knee.  She fell on all fours and since that time she's had increasing discomfort in the left knee that she did not have before this incident.       Social History     Occupational History   • Not on file.     Social History Main Topics   • Smoking status: Never Smoker   • Smokeless tobacco: Never Used   • Alcohol use No   • Drug use: No   • Sexual activity: Defer      Review of Systems      Past Medical History:   Diagnosis Date   • Anxiety    • Depression    • Migraine    • Recurrent left knee instability     sched patellafemoral junction repair     Past Surgical History:   Procedure Laterality Date   • PATELLA SURGERY  02/10/2017   • PATELLOFEMORAL JUNCTION REPAIR Left 2/10/2017    Procedure: PATELLOFEMORAL JUNCTION REPAIR with VMO advancement and medial retinacular reconstruction;  Surgeon: Saqib Salazar MD;  [facility]: [facility];  Service:    • TONSILLECTOMY AND ADENOIDECTOMY       Family History   Problem Relation Age of Onset   • Diabetes Father          Objective:  There were no vitals filed for this visit.  There were no vitals filed for this visit.  There is no height or weight on file to calculate BMI.       Ortho Exam  AP lateral sunrise view of the left knee done to evaluate her chief complain of pain to his no change in position of the patella compared to previous x-rays.  On exam she is alert and oriented ×3.  The knee shows no effusion swelling erythema there is no ecchymosis.  Patella tracking is normal.  There is no crepitus noted.  Her calf is nontender negative Homans.  His skin is cool to touch.  She has 0-125° of motion with no instability.  Mild medial parapatellar tenderness but a  negative apprehension sign.  No instability noted to the patella.  She has been taken the mobile with no GI side effects tolerating that well.  Quad function is 5 out of 5.    Assessment:       1. Pain    2. Patellar subluxation, left, subsequent encounter    3. Status post orthopedic surgery, follow-up exam    4. Contusion of left knee, initial encounter          Plan:      I believe the reconstruction is stable but she is contusion of the knee.  I've offered a cortisone injection but she like to wait which I do think is appropriate at the injury only occurred last Saturday.  Return in 2 weeks if she is not showing any improvement we will inject the knee then with lidocaine and.  Continue the anti-inflammatory medication in the meantime.      Work Status:    DENICE query complete.    Orders:  Orders Placed This Encounter   Procedures   • XR Knee 3+ View With [place] Left       Medications:  No orders of the defined types were placed in this encounter.      Followup:  Return in about 2 weeks (around 8/30/2017).          Dragon transcription disclaimer     Much of this encounter note is an electronic transcription/translation of spoken language to printed text. The electronic translation of spoken language may permit erroneous, or at times, nonsensical words or phrases to be inadvertently transcribed. Although I have reviewed the note for such errors, some may still exist.

## 2017-08-31 ENCOUNTER — OFFICE VISIT (OUTPATIENT)
Dept: ORTHOPEDIC SURGERY | Facility: CLINIC | Age: 22
End: 2017-08-31

## 2017-08-31 DIAGNOSIS — S83.002D PATELLAR SUBLUXATION, LEFT, SUBSEQUENT ENCOUNTER: ICD-10-CM

## 2017-08-31 DIAGNOSIS — R52 PAIN: Primary | ICD-10-CM

## 2017-08-31 DIAGNOSIS — Z09 STATUS POST ORTHOPEDIC SURGERY, FOLLOW-UP EXAM: ICD-10-CM

## 2017-08-31 PROCEDURE — 99213 OFFICE O/P EST LOW 20 MIN: CPT | Performed by: ORTHOPAEDIC SURGERY

## 2017-08-31 PROCEDURE — 20610 DRAIN/INJ JOINT/BURSA W/O US: CPT | Performed by: ORTHOPAEDIC SURGERY

## 2017-08-31 RX ORDER — TRIAMCINOLONE ACETONIDE 40 MG/ML
40 INJECTION, SUSPENSION INTRA-ARTICULAR; INTRAMUSCULAR
Status: COMPLETED | OUTPATIENT
Start: 2017-08-31 | End: 2017-08-31

## 2017-08-31 RX ORDER — LIDOCAINE HYDROCHLORIDE 10 MG/ML
8 INJECTION, SOLUTION EPIDURAL; INFILTRATION; INTRACAUDAL; PERINEURAL
Status: COMPLETED | OUTPATIENT
Start: 2017-08-31 | End: 2017-08-31

## 2017-08-31 RX ADMIN — LIDOCAINE HYDROCHLORIDE 8 ML: 10 INJECTION, SOLUTION EPIDURAL; INFILTRATION; INTRACAUDAL; PERINEURAL at 15:35

## 2017-08-31 RX ADMIN — TRIAMCINOLONE ACETONIDE 40 MG: 40 INJECTION, SUSPENSION INTRA-ARTICULAR; INTRAMUSCULAR at 15:35

## 2017-09-18 ENCOUNTER — OFFICE VISIT (OUTPATIENT)
Dept: ORTHOPEDIC SURGERY | Facility: CLINIC | Age: 22
End: 2017-09-18

## 2017-09-18 VITALS — WEIGHT: 199 LBS | BODY MASS INDEX: 37.57 KG/M2 | HEIGHT: 61 IN

## 2017-09-18 DIAGNOSIS — S80.02XA CONTUSION OF LEFT KNEE, INITIAL ENCOUNTER: ICD-10-CM

## 2017-09-18 DIAGNOSIS — Z09 STATUS POST ORTHOPEDIC SURGERY, FOLLOW-UP EXAM: ICD-10-CM

## 2017-09-18 DIAGNOSIS — R52 PAIN: Primary | ICD-10-CM

## 2017-09-18 DIAGNOSIS — S83.002D PATELLAR SUBLUXATION, LEFT, SUBSEQUENT ENCOUNTER: ICD-10-CM

## 2017-09-18 PROCEDURE — 99212 OFFICE O/P EST SF 10 MIN: CPT | Performed by: ORTHOPAEDIC SURGERY

## 2017-09-18 NOTE — PROGRESS NOTES
Subjective: Status post patellar retinacular reconstruction     Patient ID: Bisi Bedoya is a 22 y.o. female.    Chief Complaint:    History of Present Illness patient is seen in follow-up after having undergone a cortisone injection last visit is no complete resolution of the pain discomfort she sustained after she fell contused the knee.  Dissection doing quite well in a home exercise program with resolution of her pain and all the preoperative pain that she's had has resolved.       Social History     Occupational History   • Not on file.     Social History Main Topics   • Smoking status: Never Smoker   • Smokeless tobacco: Never Used   • Alcohol use No   • Drug use: No   • Sexual activity: Defer      Review of Systems   Constitutional: Negative for chills, diaphoresis, fever and unexpected weight change.   HENT: Negative for hearing loss, nosebleeds, sore throat and tinnitus.    Eyes: Negative for pain and visual disturbance.   Respiratory: Negative for cough, shortness of breath and wheezing.    Cardiovascular: Negative for chest pain and palpitations.   Gastrointestinal: Negative for abdominal pain, diarrhea, nausea and vomiting.   Endocrine: Negative for cold intolerance, heat intolerance and polydipsia.   Genitourinary: Negative for difficulty urinating, dysuria and hematuria.   Musculoskeletal: Negative for arthralgias, joint swelling and myalgias.   Skin: Negative for rash and wound.   Allergic/Immunologic: Negative for environmental allergies.   Neurological: Negative for dizziness, syncope and numbness.   Hematological: Does not bruise/bleed easily.   Psychiatric/Behavioral: Negative for dysphoric mood and sleep disturbance. The patient is not nervous/anxious.          Past Medical History:   Diagnosis Date   • Anxiety    • Depression    • Migraine    • Recurrent left knee instability     sched patellafemoral junction repair     Past Surgical History:   Procedure Laterality Date   • PATELLA  SURGERY  02/10/2017   • PATELLOFEMORAL JUNCTION REPAIR Left 2/10/2017    Procedure: PATELLOFEMORAL JUNCTION REPAIR with VMO advancement and medial retinacular reconstruction;  Surgeon: Saqib Salazar MD;  Location: [facility];  Service:    • TONSILLECTOMY AND ADENOIDECTOMY       Family History   Problem Relation Age of Onset   • Diabetes Father          Objective:  There were no vitals filed for this visit.  Last 3 weights    09/18/17  1400   Weight: 199 lb (90.3 kg)     Body mass index is 37.6 kg/(m^2).       Ortho Exam  she is alert and oriented ×3.  The knee shows no swelling effusion erythema she has 0-125° of motion but no patella subluxation.  Her calf is nontender negative Homans.  Quad function is 5 over 5.  No sensory loss no motor loss.    Assessment:       1. Pain    2. Patellar subluxation, left, subsequent encounter    3. Status post orthopedic surgery, follow-up exam    4. Contusion of left knee, initial encounter          Plan:      continue the home exercise program.  Return to see me in December with x-rays of the knee AP lateral sunrise view.      Work Status:    [employer] query complete.    Orders:  No orders of the defined types were placed in this encounter.      Medications:  No orders of the defined types were placed in this encounter.      Followup:  Return in about 3 months (around 12/18/2017).          Dragon transcription disclaimer     Much of this encounter note is an electronic transcription/translation of spoken language to printed text. The electronic translation of spoken language may permit erroneous, or at times, nonsensical words or phrases to be inadvertently transcribed. Although I have reviewed the note for such errors, some may still exist.

## 2017-12-11 ENCOUNTER — OFFICE VISIT (OUTPATIENT)
Dept: ORTHOPEDIC SURGERY | Facility: CLINIC | Age: 22
End: 2017-12-11

## 2017-12-11 VITALS — WEIGHT: 215 LBS | BODY MASS INDEX: 40.59 KG/M2 | HEIGHT: 61 IN

## 2017-12-11 DIAGNOSIS — R52 PAIN: ICD-10-CM

## 2017-12-11 DIAGNOSIS — Z09 STATUS POST ORTHOPEDIC SURGERY, FOLLOW-UP EXAM: ICD-10-CM

## 2017-12-11 DIAGNOSIS — S83.002D PATELLAR SUBLUXATION, LEFT, SUBSEQUENT ENCOUNTER: ICD-10-CM

## 2017-12-11 DIAGNOSIS — Z98.890 POST-OPERATIVE STATE: Primary | ICD-10-CM

## 2017-12-11 PROCEDURE — 73562 X-RAY EXAM OF KNEE 3: CPT | Performed by: ORTHOPAEDIC SURGERY

## 2017-12-11 PROCEDURE — 99213 OFFICE O/P EST LOW 20 MIN: CPT | Performed by: ORTHOPAEDIC SURGERY

## 2017-12-11 NOTE — PROGRESS NOTES
Subjective: Status post left medial parapatellar retinacular reconstruction     Patient ID: Bisi Bedoya is a 22 y.o. female.    Chief Complaint:    History of Present Illness patient is doing well states all of her preoperative pain has resolved.  Ambulating freely without discomfort.  Some sensation she states in the right knee popping but she is not having any pain in the knee Has not subluxed or dislocated       Social History     Occupational History   • Not on file.     Social History Main Topics   • Smoking status: Never Smoker   • Smokeless tobacco: Never Used   • Alcohol use No   • Drug use: No   • Sexual activity: Defer      Review of Systems   Constitutional: Negative for chills, diaphoresis, fever and unexpected weight change.   HENT: Negative for hearing loss, nosebleeds, sore throat and tinnitus.    Eyes: Negative for pain and visual disturbance.   Respiratory: Negative for cough, shortness of breath and wheezing.    Cardiovascular: Negative for chest pain and palpitations.   Gastrointestinal: Negative for abdominal pain, diarrhea, nausea and vomiting.   Endocrine: Negative for cold intolerance, heat intolerance and polydipsia.   Genitourinary: Negative for difficulty urinating, dysuria and hematuria.   Musculoskeletal: Negative for arthralgias, joint swelling and myalgias.   Skin: Negative for rash and wound.   Allergic/Immunologic: Negative for environmental allergies.   Neurological: Negative for dizziness, syncope and numbness.   Hematological: Does not bruise/bleed easily.   Psychiatric/Behavioral: Negative for dysphoric mood and sleep disturbance. The patient is not nervous/anxious.          Past Medical History:   Diagnosis Date   • Anxiety    • Depression    • Migraine    • Recurrent left knee instability     sched patellafemoral junction repair     Past Surgical History:   Procedure Laterality Date   • PATELLA SURGERY  02/10/2017   • PATELLOFEMORAL JUNCTION REPAIR Left 2/10/2017     Procedure: PATELLOFEMORAL JUNCTION REPAIR with VMO advancement and medial retinacular reconstruction;  Surgeon: Saqib Salazar MD;  Location: [facility];  Service:    • TONSILLECTOMY AND ADENOIDECTOMY       Family History   Problem Relation Age of Onset   • Diabetes Father          Objective:  There were no vitals filed for this visit.  Last 3 weights    12/11/17  1146   Weight: 97.5 kg (215 lb)     Body mass index is 40.62 kg/(m^2).       Ortho Exam  AP lateral sunrise view of the left knee shows the patella slightly laterally line but no other acute changes compared to previous x-rays.  She is alert and oriented ×3.  Left leg has full range of motion as far as the knee is concerned 0 125° with no patella crepitus.  No patella subluxation and negative apprehension sign medially.  She has good distal pulses no motor deficit in the calf is nontender.  Still having moderate VMO weakness.  The right leg shows no motor deficit good distal pulses no sensory loss.  No effusion to the knee.  No crepitus with range of motion.  Negative medial apprehension sign with regard to the patella.  Quad function the VMO is 4 out of 5.    Assessment:       1. Post-operative state    2. Pain    3. Patellar subluxation, left, subsequent encounter    4. Status post orthopedic surgery, follow-up exam          Plan:        Continue working on strengthening exercises.  Return to see me in 3 months    Work Status:    DENICE query complete.    Orders:  Orders Placed This Encounter   Procedures   • XR Knee 3 View Left       Medications:  No orders of the defined types were placed in this encounter.      Followup:  Return in about 3 months (around 3/11/2018).          Dragon transcription disclaimer     Much of this encounter note is an electronic transcription/translation of spoken language to printed text. The electronic translation of spoken language may permit erroneous, or at times, nonsensical words or phrases to be inadvertently  transcribed. Although I have reviewed the note for such errors, some may still exist.

## 2018-01-30 RX ORDER — MELOXICAM 7.5 MG/1
TABLET ORAL
Qty: 30 TABLET | Refills: 5 | Status: SHIPPED | OUTPATIENT
Start: 2018-01-30 | End: 2021-01-11 | Stop reason: SDUPTHER

## 2018-03-12 ENCOUNTER — OFFICE VISIT (OUTPATIENT)
Dept: ORTHOPEDIC SURGERY | Facility: CLINIC | Age: 23
End: 2018-03-12

## 2018-03-12 DIAGNOSIS — R52 PAIN: ICD-10-CM

## 2018-03-12 DIAGNOSIS — Z98.890 POST-OPERATIVE STATE: Primary | ICD-10-CM

## 2018-03-12 DIAGNOSIS — S83.002D PATELLAR SUBLUXATION, LEFT, SUBSEQUENT ENCOUNTER: ICD-10-CM

## 2018-03-12 PROCEDURE — 99212 OFFICE O/P EST SF 10 MIN: CPT | Performed by: ORTHOPAEDIC SURGERY

## 2018-03-12 RX ORDER — MOMETASONE FUROATE 1 MG/G
CREAM TOPICAL
Refills: 6 | COMMUNITY
Start: 2018-03-06 | End: 2023-02-22

## 2018-03-12 RX ORDER — SUMATRIPTAN 100 MG/1
TABLET, FILM COATED ORAL
Refills: 11 | COMMUNITY
Start: 2018-02-26 | End: 2021-09-19

## 2018-03-12 RX ORDER — CETIRIZINE HYDROCHLORIDE 10 MG/1
TABLET ORAL
Refills: 1 | COMMUNITY
Start: 2018-02-14

## 2018-03-12 RX ORDER — SUMATRIPTAN 20 MG/1
SPRAY NASAL
Refills: 11 | COMMUNITY
Start: 2018-02-26 | End: 2021-09-19

## 2018-03-12 NOTE — PROGRESS NOTES
Subjective: status post left patella subluxation     Patient ID: Bisi Bedoya is a 22 y.o. female.    Chief Complaint:    History of Present Illness patient is 13 months post surgery he's doing well again has  absolutely no pain in the knee. Occasional popping but no discomfort or limitation as a fermín activity.       Social History     Occupational History   • Not on file.     Social History Main Topics   • Smoking status: Never Smoker   • Smokeless tobacco: Never Used   • Alcohol use No   • Drug use: No   • Sexual activity: Defer      Review of Systems   Constitutional: Negative for chills, diaphoresis, fever and unexpected weight change.   HENT: Negative for hearing loss, nosebleeds, sore throat and tinnitus.    Eyes: Negative for pain and visual disturbance.   Respiratory: Negative for cough, shortness of breath and wheezing.    Cardiovascular: Negative for chest pain and palpitations.   Gastrointestinal: Negative for abdominal pain, diarrhea, nausea and vomiting.   Endocrine: Negative for cold intolerance, heat intolerance and polydipsia.   Genitourinary: Negative for difficulty urinating, dysuria and hematuria.   Musculoskeletal: Negative for arthralgias, joint swelling and myalgias.   Skin: Negative for rash and wound.   Allergic/Immunologic: Negative for environmental allergies.   Neurological: Negative for dizziness, syncope and numbness.   Hematological: Does not bruise/bleed easily.   Psychiatric/Behavioral: Negative for dysphoric mood and sleep disturbance. The patient is not nervous/anxious.    All other systems reviewed and are negative.          Objective:     Ortho Exam   She is alert and oriented ×3. She has 0-125° of motion with  no patella  Crepitance or subluxation.  Quad function is 5 over 5.  Calf is nontender. No motor or sensory deficit.    Assessment:       1. Post-operative state    2. Pain    3. Patellar subluxation, left, subsequent encounter          Plan:   continue   Strengthening exercise and return to see me when necssary

## 2019-08-30 NOTE — PLAN OF CARE
Please, do CRF for Wound Care -  Let pt know when done   Thanks Problem: Perioperative Period (Adult)  Goal: Signs and Symptoms of Listed Potential Problems Will be Absent or Manageable (Perioperative Period)  Outcome: Ongoing (interventions implemented as appropriate)    02/10/17 4407   Perioperative Period   Problems Assessed (Perioperative Period) all   Problems Present (Perioperative Period) none

## 2020-11-10 ENCOUNTER — HOSPITAL ENCOUNTER (EMERGENCY)
Facility: HOSPITAL | Age: 25
Discharge: LEFT BEFORE DISCHARGE | End: 2020-11-10

## 2020-11-10 VITALS
TEMPERATURE: 97.7 F | DIASTOLIC BLOOD PRESSURE: 94 MMHG | HEART RATE: 71 BPM | WEIGHT: 236 LBS | OXYGEN SATURATION: 100 % | RESPIRATION RATE: 16 BRPM | SYSTOLIC BLOOD PRESSURE: 147 MMHG | HEIGHT: 62 IN | BODY MASS INDEX: 43.43 KG/M2

## 2020-11-11 NOTE — ED TRIAGE NOTES
Called pt nearly 3 hours after triage to see if she is still here because a bed is finally available.  Pt did not answer.

## 2021-01-11 ENCOUNTER — OFFICE VISIT (OUTPATIENT)
Dept: ORTHOPEDIC SURGERY | Facility: CLINIC | Age: 26
End: 2021-01-11

## 2021-01-11 VITALS
WEIGHT: 248 LBS | SYSTOLIC BLOOD PRESSURE: 143 MMHG | HEIGHT: 62 IN | HEART RATE: 81 BPM | BODY MASS INDEX: 45.64 KG/M2 | DIASTOLIC BLOOD PRESSURE: 91 MMHG

## 2021-01-11 DIAGNOSIS — S83.001D SUBLUXATION OF RIGHT PATELLA, SUBSEQUENT ENCOUNTER: ICD-10-CM

## 2021-01-11 DIAGNOSIS — R52 PAIN: Primary | ICD-10-CM

## 2021-01-11 DIAGNOSIS — S93.491A SPRAIN OF POSTERIOR TALOFIBULAR LIGAMENT OF RIGHT ANKLE, INITIAL ENCOUNTER: ICD-10-CM

## 2021-01-11 PROBLEM — R10.13 ABDOMINAL PAIN, EPIGASTRIC: Status: ACTIVE | Noted: 2020-03-17

## 2021-01-11 PROBLEM — R13.10 DYSPHAGIA: Status: ACTIVE | Noted: 2018-11-15

## 2021-01-11 PROBLEM — E66.9 OBESITY: Status: ACTIVE | Noted: 2018-11-15

## 2021-01-11 PROBLEM — G43.909 MIGRAINE: Status: ACTIVE | Noted: 2018-12-18

## 2021-01-11 PROBLEM — J30.9 ALLERGIC RHINITIS: Status: ACTIVE | Noted: 2018-12-18

## 2021-01-11 PROBLEM — R10.819 ABDOMINAL TENDERNESS: Status: ACTIVE | Noted: 2018-12-18

## 2021-01-11 PROBLEM — H66.90 ACUTE OTITIS MEDIA: Status: ACTIVE | Noted: 2018-12-26

## 2021-01-11 PROBLEM — F32.0 CURRENT MILD EPISODE OF MAJOR DEPRESSIVE DISORDER WITHOUT PRIOR EPISODE (HCC): Status: ACTIVE | Noted: 2019-03-06

## 2021-01-11 PROBLEM — T78.40XA ALLERGIC REACTION: Status: ACTIVE | Noted: 2018-02-14

## 2021-01-11 PROBLEM — R32 INCONTINENCE: Status: ACTIVE | Noted: 2017-04-04

## 2021-01-11 PROBLEM — S49.91XA INJURY OF RIGHT SHOULDER: Status: ACTIVE | Noted: 2020-09-14

## 2021-01-11 PROBLEM — R03.0 ELEVATED BLOOD-PRESSURE READING WITHOUT DIAGNOSIS OF HYPERTENSION: Status: ACTIVE | Noted: 2018-11-15

## 2021-01-11 PROBLEM — I10 ESSENTIAL HYPERTENSION: Status: ACTIVE | Noted: 2019-06-06

## 2021-01-11 PROBLEM — L02.429 FURUNCLE OF LOWER LEG: Status: ACTIVE | Noted: 2017-12-26

## 2021-01-11 PROBLEM — K21.9 GASTROESOPHAGEAL REFLUX DISEASE: Status: ACTIVE | Noted: 2019-02-06

## 2021-01-11 PROBLEM — K20.0 EOSINOPHILIC ESOPHAGITIS: Status: ACTIVE | Noted: 2019-02-06

## 2021-01-11 PROBLEM — G47.00 INSOMNIA: Status: ACTIVE | Noted: 2018-11-15

## 2021-01-11 PROBLEM — R11.10 REGURGITATION OF FOOD: Status: ACTIVE | Noted: 2018-12-18

## 2021-01-11 PROCEDURE — 99214 OFFICE O/P EST MOD 30 MIN: CPT | Performed by: ORTHOPAEDIC SURGERY

## 2021-01-11 PROCEDURE — 73610 X-RAY EXAM OF ANKLE: CPT | Performed by: ORTHOPAEDIC SURGERY

## 2021-01-11 PROCEDURE — 73562 X-RAY EXAM OF KNEE 3: CPT | Performed by: ORTHOPAEDIC SURGERY

## 2021-01-11 RX ORDER — MELOXICAM 7.5 MG/1
7.5 TABLET ORAL DAILY
Qty: 30 TABLET | Refills: 5 | Status: SHIPPED | OUTPATIENT
Start: 2021-01-11 | End: 2021-09-27

## 2021-01-11 NOTE — PROGRESS NOTES
Subjective: Right knee pain, right ankle pain     Patient ID: Uri Bedoya is a 25 y.o. female.    Chief Complaint:    History of Present Illness patient known to me is seen for right knee which he injured on January 2 of this year.  While cleaning she felt her kneecap pop in and out of place.  Denies any prior problem with the knee.  Previously seen and treated for subluxing left patella.  Initially had moderate pain she treated with 1 Aleve a day and states is better but still some residual soreness.  The ankle she injured couple of months ago when she sustained an inversion type injury having some recurrent lateral pain.       Social History     Occupational History   • Not on file   Tobacco Use   • Smoking status: Never Smoker   • Smokeless tobacco: Never Used   Substance and Sexual Activity   • Alcohol use: No   • Drug use: No   • Sexual activity: Defer      Review of Systems   Constitutional: Negative for appetite change, chills, diaphoresis, fever and unexpected weight change.   HENT: Negative for hearing loss, nosebleeds, sore throat and tinnitus.    Eyes: Negative for pain and visual disturbance.   Respiratory: Negative for cough, shortness of breath and wheezing.    Cardiovascular: Negative for chest pain and palpitations.   Gastrointestinal: Negative for abdominal pain, diarrhea, nausea and vomiting.   Endocrine: Negative for cold intolerance, heat intolerance and polydipsia.   Genitourinary: Negative for difficulty urinating, dysuria and hematuria.   Musculoskeletal: Positive for arthralgias and myalgias. Negative for joint swelling.   Skin: Negative for rash and wound.   Allergic/Immunologic: Negative for environmental allergies.   Neurological: Negative for dizziness, syncope and numbness.   Hematological: Does not bruise/bleed easily.   Psychiatric/Behavioral: Negative for dysphoric mood and sleep disturbance. The patient is not nervous/anxious.          Past Medical History:   Diagnosis Date    • Anxiety    • Depression    • Migraine    • Recurrent left knee instability     sched patellafemoral junction repair     Past Surgical History:   Procedure Laterality Date   • PATELLA SURGERY  02/10/2017   • PATELLOFEMORAL JUNCTION REPAIR Left 2/10/2017    Procedure: PATELLOFEMORAL JUNCTION REPAIR with VMO advancement and medial retinacular reconstruction;  Surgeon: Saqib Salazar MD;  Location: [facility];  Service:    • TONSILLECTOMY AND ADENOIDECTOMY       Family History   Problem Relation Age of Onset   • Diabetes Father          Objective:  Vitals:    01/11/21 1422   BP: 143/91   Pulse: 81         01/11/21  1422   Weight: 112 kg (248 lb)     Body mass index is 45.36 kg/m².        Ortho Exam   AP lateral sunrise view of the right knee does show a lateral subluxed patella with a shallow trochlear groove but no acute injuries noted.  AP lateral oblique view of the ankle is complete within normal limits showing no acute or chronic changes.  She is alert and oriented x3.  The right knee has no swelling effusion erythema and there is minimal patellofemoral crepitus with range of motion to 0 to 125 degrees.  She does have a mildly positive medial apprehension sign along the patella.  There is no instability 09 degrees nor to varus or valgus instability.  There is some mild lateral subluxation of the patella and full extension.  Quad and hamstring function are 5/5 and the calf is nontender.  The right ankle is tender along the posterior tibiotalar ligament and the posterior tibialis tendon.  No anterior tibiotalar pain medial side of the ankle benign.  Good distal pulses no motor or sensory deficit.  Achilles tendon is negative.    Assessment:        1. Pain    2. Subluxation of right patella, subsequent encounter    3. Sprain of posterior talofibular ligament of right ankle, initial encounter           Plan: Reviewed the results of the x-rays of the knee and the ankle with the patient and reviewed her history.   Recommended a soft knee sleeve for the knee.  Advised her starting on the strengthening exercises she did on her left knee.  Start her on meloxicam 7.5 daily for the knee and the ankle.  Return in a month if still symptomatic otherwise as needed.            Work Status:    DEN[employer] query complete.    Orders:  Orders Placed This Encounter   Procedures   • XR Knee 3 View Right   • XR Ankle 3+ View Right       Medications:  New Medications Ordered This Visit   Medications   • meloxicam (MOBIC) 7.5 MG tablet     Sig: Take 1 tablet by mouth Daily.     Dispense:  30 tablet     Refill:  5       Followup:  Return if symptoms worsen or fail to improve.          Dictated utilizing Dragon dictation

## 2021-06-14 DIAGNOSIS — S83.001D SUBLUXATION OF RIGHT PATELLA, SUBSEQUENT ENCOUNTER: Primary | ICD-10-CM

## 2021-06-14 DIAGNOSIS — S93.491A SPRAIN OF POSTERIOR TALOFIBULAR LIGAMENT OF RIGHT ANKLE, INITIAL ENCOUNTER: ICD-10-CM

## 2021-06-14 RX ORDER — MELOXICAM 7.5 MG/1
TABLET ORAL
Qty: 30 TABLET | Refills: 5 | OUTPATIENT
Start: 2021-06-14

## 2021-06-14 NOTE — TELEPHONE ENCOUNTER
Patient needs CBC and CMP before refill can be prescribed.  I see nothing in the chart unless she is having 1 done elsewhere

## 2021-09-27 ENCOUNTER — OFFICE VISIT (OUTPATIENT)
Dept: ORTHOPEDIC SURGERY | Facility: CLINIC | Age: 26
End: 2021-09-27

## 2021-09-27 VITALS
BODY MASS INDEX: 47.07 KG/M2 | SYSTOLIC BLOOD PRESSURE: 132 MMHG | DIASTOLIC BLOOD PRESSURE: 89 MMHG | RESPIRATION RATE: 16 BRPM | WEIGHT: 255.8 LBS | HEIGHT: 62 IN | HEART RATE: 105 BPM

## 2021-09-27 DIAGNOSIS — M25.562 LEFT KNEE PAIN, UNSPECIFIED CHRONICITY: Primary | ICD-10-CM

## 2021-09-27 DIAGNOSIS — S83.412A SPRAIN OF MEDIAL COLLATERAL LIGAMENT OF LEFT KNEE, INITIAL ENCOUNTER: ICD-10-CM

## 2021-09-27 PROCEDURE — 73562 X-RAY EXAM OF KNEE 3: CPT | Performed by: ORTHOPAEDIC SURGERY

## 2021-09-27 PROCEDURE — 99214 OFFICE O/P EST MOD 30 MIN: CPT | Performed by: ORTHOPAEDIC SURGERY

## 2021-09-27 RX ORDER — MELOXICAM 15 MG/1
15 TABLET ORAL DAILY
Qty: 30 TABLET | Refills: 5 | Status: SHIPPED | OUTPATIENT
Start: 2021-09-27 | End: 2022-03-28

## 2021-09-27 RX ORDER — METHYLPREDNISOLONE 4 MG/1
TABLET ORAL
Qty: 21 TABLET | Refills: 0 | Status: SHIPPED | OUTPATIENT
Start: 2021-09-27 | End: 2021-11-12

## 2021-09-27 NOTE — PROGRESS NOTES
Subjective: Left knee pain     Patient ID: Uri Bedoya is a 26 y.o. female.    Chief Complaint:    History of Present Illness 46-year-old female known to me having undergone a VMO advancement for subluxing left patella back in 2017 is seen for acute injury sustained to her knee at work on August 26.  Prior to the injury she states she is doing well with no new complaints.  She states she slipped on wet floor and strained her left knee.  She did not hit the ground.  Was seen at urgent care x-rayed and placed on ibuprofen.  States she is off work for 2 weeks she is gone back last week to sitdown work only.  States that he does feel better.       Social History     Occupational History   • Not on file   Tobacco Use   • Smoking status: Never Smoker   • Smokeless tobacco: Never Used   Vaping Use   • Vaping Use: Never used   Substance and Sexual Activity   • Alcohol use: No   • Drug use: No   • Sexual activity: Defer      Review of Systems   Constitutional: Negative for chills, diaphoresis, fever and unexpected weight change.   HENT: Negative for hearing loss, nosebleeds, sore throat and tinnitus.    Eyes: Negative for pain and visual disturbance.   Respiratory: Negative for cough, shortness of breath and wheezing.    Cardiovascular: Negative for chest pain and palpitations.   Gastrointestinal: Negative for abdominal pain, diarrhea, nausea and vomiting.   Endocrine: Negative for cold intolerance, heat intolerance and polydipsia.   Genitourinary: Negative for difficulty urinating, dysuria and hematuria.   Musculoskeletal: Positive for arthralgias and myalgias. Negative for joint swelling.   Skin: Negative for rash and wound.   Allergic/Immunologic: Negative for environmental allergies.   Neurological: Negative for dizziness, syncope and numbness.   Hematological: Does not bruise/bleed easily.   Psychiatric/Behavioral: Negative for dysphoric mood and sleep disturbance. The patient is not nervous/anxious.           Past Medical History:   Diagnosis Date   • Anxiety    • Depression    • Migraine    • Recurrent left knee instability     sched patellafemoral junction repair     Past Surgical History:   Procedure Laterality Date   • PATELLA SURGERY  02/10/2017   • PATELLOFEMORAL JUNCTION REPAIR Left 2/10/2017    Procedure: PATELLOFEMORAL JUNCTION REPAIR with VMO advancement and medial retinacular reconstruction;  Surgeon: Saqib Salazar MD;  Location: [facility];  Service:    • TONSILLECTOMY AND ADENOIDECTOMY       Family History   Problem Relation Age of Onset   • Diabetes Father          Objective:  Vitals:    09/27/21 1050   BP: 132/89   Pulse: 105   Resp: 16         09/27/21  1050   Weight: 116 kg (255 lb 12.8 oz)     Body mass index is 46.79 kg/m².        Ortho Exam   X-rays obtained in the office AP lateral sunrise view show no acute or chronic changes.  The patella is well situated in trochlear groove when compared to postop x-rays done back in 2017 the position remains unchanged well located.  She is alert and oriented x3.  The knee shows no swelling effusion erythema.  She has a well-healed midline patella incision.  She has 0 to 125 degrees of motion with no patella subluxation with a negative medial apprehension sign.  Quad and hamstring function may 5/5.  She has negative Tesha's bilaterally.  No instability at 0 90 degrees there is mild pain with valgus stressing and is pain to palpation over the MCL.  Her calf is nontender although she states it does hurt sometimes when she walks but she has a negative Homans.  She has good is to pulses good capillary refill the skin is cool to touch.  She is tolerating the ibuprofen but she is only taking 200 mg once a day.    Assessment:        1. Left knee pain, unspecified chronicity    2. Sprain of medial collateral ligament of left knee, initial encounter           Plan: Reviewed the history x-ray physical exam of the patient.  Believe she sustained a strain of the MCL of  her left knee.  I recommend she begin a steroid pack followed by 15 mg of meloxicam daily.  I keep her on sitdown work only for seen in 4 weeks.  Not asymptomatic on return we will order an MRI.  Answered all questions            Work Status:    [employer] query complete.    Orders:  Orders Placed This Encounter   Procedures   • XR Knee 3 View Left       Medications:  New Medications Ordered This Visit   Medications   • meloxicam (MOBIC) 15 MG tablet     Sig: Take 1 tablet by mouth Daily.     Dispense:  30 tablet     Refill:  5   • methylPREDNISolone (MEDROL) 4 MG dose pack     Sig: Use as directed by package instructions     Dispense:  21 tablet     Refill:  0       Followup:  Return in about 4 weeks (around 10/25/2021).          Dictated utilizing Dragon dictation

## 2021-10-20 ENCOUNTER — OFFICE VISIT (OUTPATIENT)
Dept: ORTHOPEDIC SURGERY | Facility: CLINIC | Age: 26
End: 2021-10-20

## 2021-10-20 VITALS — BODY MASS INDEX: 46.93 KG/M2 | HEIGHT: 62 IN | WEIGHT: 255 LBS

## 2021-10-20 DIAGNOSIS — M25.562 LEFT KNEE PAIN, UNSPECIFIED CHRONICITY: Primary | ICD-10-CM

## 2021-10-20 DIAGNOSIS — S83.412A SPRAIN OF MEDIAL COLLATERAL LIGAMENT OF LEFT KNEE, INITIAL ENCOUNTER: ICD-10-CM

## 2021-10-20 PROCEDURE — 99213 OFFICE O/P EST LOW 20 MIN: CPT | Performed by: ORTHOPAEDIC SURGERY

## 2021-10-20 RX ORDER — FLUTICASONE PROPIONATE 50 MCG
SPRAY, SUSPENSION (ML) NASAL
COMMUNITY
Start: 2021-10-08

## 2021-10-20 RX ORDER — PROPRANOLOL HYDROCHLORIDE 80 MG/1
80 CAPSULE, EXTENDED RELEASE ORAL DAILY
COMMUNITY
Start: 2021-08-25

## 2021-10-20 NOTE — PROGRESS NOTES
Subjective: Left knee pain     Patient ID: Uri Bedoya is a 26 y.o. female.    Chief Complaint:    History of Present Illness 26-year-old female returns with persistent pain discomfort in the left knee.  States the pain is probably 10 now down to a 7 there is still has persistent pain along the medial aspect of the knee.  Has been taking meloxicam faithfully and wearing soft knee sleeve.  Has been doing sitdown work only at work but is persistent having discomfort.       Social History     Occupational History   • Not on file   Tobacco Use   • Smoking status: Never Smoker   • Smokeless tobacco: Never Used   Vaping Use   • Vaping Use: Never used   Substance and Sexual Activity   • Alcohol use: No   • Drug use: No   • Sexual activity: Defer      Review of Systems   Constitutional: Negative for chills, diaphoresis, fever and unexpected weight change.   HENT: Negative for hearing loss, nosebleeds, sore throat and tinnitus.    Eyes: Negative for pain and visual disturbance.   Respiratory: Negative for cough, shortness of breath and wheezing.    Cardiovascular: Negative for chest pain and palpitations.   Gastrointestinal: Negative for abdominal pain, diarrhea, nausea and vomiting.   Endocrine: Negative for cold intolerance, heat intolerance and polydipsia.   Genitourinary: Negative for difficulty urinating, dysuria and hematuria.   Musculoskeletal: Positive for arthralgias and myalgias. Negative for joint swelling.   Skin: Negative for rash and wound.   Allergic/Immunologic: Negative for environmental allergies.   Neurological: Negative for dizziness, syncope and numbness.   Hematological: Does not bruise/bleed easily.   Psychiatric/Behavioral: Negative for dysphoric mood and sleep disturbance. The patient is not nervous/anxious.          Past Medical History:   Diagnosis Date   • Anxiety    • Depression    • Migraine    • Recurrent left knee instability     sched patellafemoral junction repair     Past Surgical  History:   Procedure Laterality Date   • PATELLA SURGERY  02/10/2017   • PATELLOFEMORAL JUNCTION REPAIR Left 2/10/2017    Procedure: PATELLOFEMORAL JUNCTION REPAIR with VMO advancement and medial retinacular reconstruction;  Surgeon: Saqib Salazar MD;  Location: [facility];  Service:    • TONSILLECTOMY AND ADENOIDECTOMY       Family History   Problem Relation Age of Onset   • Diabetes Father          Objective:  There were no vitals filed for this visit.      10/20/21  1504   Weight: 116 kg (255 lb)     Body mass index is 46.64 kg/m².        Ortho Exam   He is alert and oriented x3.  The again shows no signs of erythema but there is persistent tenderness along the MCL of the joint line of the tibial insertion site.  Mild medial joint line tenderness and negative Tesha's.  Skin 0 125 degrees of motion with no instability.  Minimal tenderness with valgus stressing medially.  No AP instability.  No patella discomfort negative apprehension sign.  Her calf is nontender she has good distal pulses no motor or sensory deficit.  Tolerating meloxicam.  Still having pain though with activity    Assessment:        1. Left knee pain, unspecified chronicity    2. Sprain of medial collateral ligament of left knee, initial encounter           Plan:  Reviewed her symptoms and her exam.  I want to see her up for physical therapy.  Continue the meloxicam.  Return in 3 weeks.  Continue sitdown work only till seen.  She is not asymptomatic on return we will then order an MRI of the knee.  Answered all questions          Work Status:    DENICE query complete.    Orders:  Orders Placed This Encounter   Procedures   • Ambulatory Referral to Physical Therapy Evaluate and treat       Medications:  No orders of the defined types were placed in this encounter.      Followup:  Return in about 3 weeks (around 11/10/2021).          Dictated utilizing Dragon dictation

## 2021-11-12 ENCOUNTER — OFFICE VISIT (OUTPATIENT)
Dept: ORTHOPEDIC SURGERY | Facility: CLINIC | Age: 26
End: 2021-11-12

## 2021-11-12 VITALS — WEIGHT: 255 LBS | BODY MASS INDEX: 46.93 KG/M2 | HEIGHT: 62 IN

## 2021-11-12 DIAGNOSIS — S83.412A SPRAIN OF MEDIAL COLLATERAL LIGAMENT OF LEFT KNEE, INITIAL ENCOUNTER: ICD-10-CM

## 2021-11-12 DIAGNOSIS — M25.562 LEFT KNEE PAIN, UNSPECIFIED CHRONICITY: Primary | ICD-10-CM

## 2021-11-12 PROCEDURE — 99213 OFFICE O/P EST LOW 20 MIN: CPT | Performed by: ORTHOPAEDIC SURGERY

## 2021-11-12 NOTE — PROGRESS NOTES
Subjective: Left knee pain     Patient ID: Uri Bedoya is a 26 y.o. female.    Chief Complaint:    History of Present Illness patient returns with persistent pain discomfort in that left knee.  Although better following the injection she still has medial joint line discomfort that limits her activity.  She has been taking the meloxicam faithfully although she has noted some improvement the medial discomfort which limits her activity has persisted.       Social History     Occupational History   • Not on file   Tobacco Use   • Smoking status: Never Smoker   • Smokeless tobacco: Never Used   Vaping Use   • Vaping Use: Never used   Substance and Sexual Activity   • Alcohol use: No   • Drug use: No   • Sexual activity: Defer      Review of Systems   Constitutional: Negative for chills, diaphoresis, fever and unexpected weight change.   HENT: Negative for hearing loss, nosebleeds, sore throat and tinnitus.    Eyes: Negative for pain and visual disturbance.   Respiratory: Negative for cough, shortness of breath and wheezing.    Cardiovascular: Negative for chest pain and palpitations.   Gastrointestinal: Negative for abdominal pain, diarrhea, nausea and vomiting.   Endocrine: Negative for cold intolerance, heat intolerance and polydipsia.   Genitourinary: Negative for difficulty urinating, dysuria and hematuria.   Musculoskeletal: Positive for arthralgias and myalgias. Negative for joint swelling.   Skin: Negative for rash and wound.   Allergic/Immunologic: Negative for environmental allergies.   Neurological: Negative for dizziness, syncope and numbness.   Hematological: Does not bruise/bleed easily.   Psychiatric/Behavioral: Negative for dysphoric mood and sleep disturbance. The patient is not nervous/anxious.          Past Medical History:   Diagnosis Date   • Anxiety    • Depression    • Migraine    • Recurrent left knee instability     sched patellafemoral junction repair     Past Surgical History:   Procedure  Laterality Date   • PATELLA SURGERY  02/10/2017   • PATELLOFEMORAL JUNCTION REPAIR Left 2/10/2017    Procedure: PATELLOFEMORAL JUNCTION REPAIR with VMO advancement and medial retinacular reconstruction;  Surgeon: Saqib Salazar MD;  Location: [facility];  Service:    • TONSILLECTOMY AND ADENOIDECTOMY       Family History   Problem Relation Age of Onset   • Diabetes Father          Objective:  There were no vitals filed for this visit.      11/12/21  1059   Weight: 116 kg (255 lb)     Body mass index is 46.64 kg/m².        Ortho Exam   She is alert and oriented x3.  The knee shows no sign effusion erythema she has 0 125 degrees of motion with no patellofemoral subluxation or crepitus.  Quad hamstring function 5/5.  No instability 09 degrees nor any varus or valgus instability at 10 to 30 degrees.  She persisted having marked medial joint line discomfort of equivocal medial Tesha's on today's exam.  The calf is nontender.  She is good distal pulses.  No motor or sensory deficits skin is cool to touch has good capillary refill.  Tolerating meloxicam without any GI side effect but she is having persistent pain.    Assessment:        1. Left knee pain, unspecified chronicity    2. Sprain of medial collateral ligament of left knee, initial encounter           Plan: Reviewed with the patient once again her symptoms and her x-rays and her exam.  I want to proceed with an MRI to rule out any kind intra-articular pathology.  If negative may get her back in physical therapy then releasing for regular duty, I want to be sure there is not a meniscal tear..  Return after completion of the MRI.  Sitdown work on until seen back.  Answered all questions            Work Status:    DENICE query complete.    Orders:  No orders of the defined types were placed in this encounter.      Medications:  No orders of the defined types were placed in this encounter.      Followup:  Return in about 4 weeks (around 12/10/2021).          Dictated  utilizing Dragon dictation

## 2021-11-30 ENCOUNTER — HOSPITAL ENCOUNTER (OUTPATIENT)
Dept: MRI IMAGING | Facility: HOSPITAL | Age: 26
Discharge: HOME | End: 2021-11-30
Admitting: ORTHOPAEDIC SURGERY

## 2021-11-30 DIAGNOSIS — S83.412A SPRAIN OF MEDIAL COLLATERAL LIGAMENT OF LEFT KNEE, INITIAL ENCOUNTER: ICD-10-CM

## 2021-11-30 DIAGNOSIS — M25.562 LEFT KNEE PAIN, UNSPECIFIED CHRONICITY: ICD-10-CM

## 2021-11-30 PROCEDURE — 73721 MRI JNT OF LWR EXTRE W/O DYE: CPT

## 2021-12-10 ENCOUNTER — OFFICE VISIT (OUTPATIENT)
Dept: ORTHOPEDIC SURGERY | Facility: CLINIC | Age: 26
End: 2021-12-10

## 2021-12-10 VITALS — BODY MASS INDEX: 46.93 KG/M2 | HEIGHT: 62 IN | WEIGHT: 255 LBS

## 2021-12-10 DIAGNOSIS — S83.282A TEAR OF LATERAL MENISCUS OF LEFT KNEE, CURRENT, UNSPECIFIED TEAR TYPE, INITIAL ENCOUNTER: ICD-10-CM

## 2021-12-10 DIAGNOSIS — S83.242A ACUTE MEDIAL MENISCUS TEAR OF LEFT KNEE, INITIAL ENCOUNTER: ICD-10-CM

## 2021-12-10 DIAGNOSIS — S83.512A RUPTURE OF ANTERIOR CRUCIATE LIGAMENT OF LEFT KNEE, INITIAL ENCOUNTER: Primary | ICD-10-CM

## 2021-12-10 PROCEDURE — 99213 OFFICE O/P EST LOW 20 MIN: CPT | Performed by: ORTHOPAEDIC SURGERY

## 2021-12-10 RX ORDER — FLUTICASONE PROPIONATE 220 UG/1
AEROSOL, METERED RESPIRATORY (INHALATION)
COMMUNITY
Start: 2021-12-07 | End: 2023-02-22

## 2021-12-10 NOTE — PROGRESS NOTES
Subjective: Left knee pain     Patient ID: Uri Bedoya is a 26 y.o. female.    Chief Complaint:    History of Present Illness patient returns with results of the MRI whose images and report are personally reviewed.  It shows a complete tear of the left ACL with bilateral meniscal tears.  Shows stable postsurgical changes from the medial patella femoral ligament reconstruction.  Patient persisted having pain and discomfort.  She has had another episode when she fell with the knee giving way.  She has continued to work.       Social History     Occupational History   • Not on file   Tobacco Use   • Smoking status: Never Smoker   • Smokeless tobacco: Never Used   Vaping Use   • Vaping Use: Never used   Substance and Sexual Activity   • Alcohol use: No   • Drug use: No   • Sexual activity: Defer      Review of Systems   Constitutional: Negative for chills, diaphoresis, fever and unexpected weight change.   HENT: Negative for hearing loss, nosebleeds, sore throat and tinnitus.    Eyes: Negative for pain and visual disturbance.   Respiratory: Negative for cough, shortness of breath and wheezing.    Cardiovascular: Negative for chest pain and palpitations.   Gastrointestinal: Negative for abdominal pain, diarrhea, nausea and vomiting.   Endocrine: Negative for cold intolerance, heat intolerance and polydipsia.   Genitourinary: Negative for difficulty urinating, dysuria and hematuria.   Musculoskeletal: Positive for arthralgias and myalgias. Negative for joint swelling.   Skin: Negative for rash and wound.   Allergic/Immunologic: Negative for environmental allergies.   Neurological: Negative for dizziness, syncope and numbness.   Hematological: Does not bruise/bleed easily.   Psychiatric/Behavioral: Negative for dysphoric mood and sleep disturbance. The patient is not nervous/anxious.          Past Medical History:   Diagnosis Date   • Anxiety    • Depression    • Migraine    • Recurrent left knee instability      sched patellafemoral junction repair     Past Surgical History:   Procedure Laterality Date   • PATELLA SURGERY  02/10/2017   • PATELLOFEMORAL JUNCTION REPAIR Left 2/10/2017    Procedure: PATELLOFEMORAL JUNCTION REPAIR with VMO advancement and medial retinacular reconstruction;  Surgeon: Saqib Salazar MD;  Location: [facility];  Service:    • TONSILLECTOMY AND ADENOIDECTOMY       Family History   Problem Relation Age of Onset   • Diabetes Father          Objective:  There were no vitals filed for this visit.      12/10/21  1052   Weight: 116 kg (255 lb)     Body mass index is 46.64 kg/m².        Ortho Exam   Is alert and oriented x3.  There is a mild effusion to the knee.  She has 0 125 degrees of motion with pain and discomfort.  There is +1 AP instability on exam today at 90 degrees.  No varus or valgus instability.  Joint line tenderness with negative Tesha's.  Quad hamstring function is 4 out of 5.  Calf is nontender.  Distal pulses no motor or sensory deficit.  Skin is cool to touch study Result    Narrative & Impression   MRI Knee LT WO     INDICATION:    Medial left knee pain status post twisting injury 2 months ago. Prior medial patellofemoral ligament reconstruction 4 years ago.     TECHNIQUE:   MRI of the left knee without IV contrast.     COMPARISON:   MRI left knee 10/14/2016     FINDINGS:   Menisci:  There is a vertical radial tear of the posterior horn lateral meniscus. Questionable subtle horizontal oblique undersurface tear along the peripheral third of the posterior horn medial meniscus. There is also increased signal along the meniscocapsular  junction of the posterior horn medial meniscus raising the possibility of meniscocapsular injury.     Ligaments:      Cruciate ligaments:  Complete rupture of the mid substance of the anterior cruciate ligament. Posterior cruciate ligament is intact.     Medial collateral ligament complex:  Intact.     Lateral collateral ligament complex:  Intact.      Extensor mechanism: Intact. Postsurgical changes from prior reconstruction of the medial patellofemoral ligament, which appears otherwise stable and intact. Patellofemoral alignment appears anatomic.     Fluid: Small to moderate joint effusion. 8 cm popliteal cyst.     Articular cartilage:     Patellofemoral compartment:  No hyaline cartilage disease.     Medial compartment:  No hyaline cartilage disease.     Lateral compartment: No hyaline cartilage disease.     Osseous structures and musculature:    No fracture, stress reaction, or osseous lesion. Bone marrow signal is within normal limits. Visualized musculature is within normal limits.     IMPRESSION:  1. Vertical radial tear of the posterior horn lateral meniscus.  2. Questionable subtle horizontal oblique undersurface tear along the peripheral third of the posterior horn medial meniscus. There is also increased signal along the meniscocapsular junction of the posterior horn medial meniscus raising the possibility  of meniscocapsular injury.  3. Complete rupture of the mid substance of the ACL.  4. Stable postsurgical changes from prior medial patellofemoral ligament reconstruction. Patellofemoral alignment appears anatomic.  5. PCL and collateral ligaments appear intact.  6. Small to moderate joint effusion with an 8 cm popliteal cyst.  7. No evidence of significant articular cartilage loss. No acute osseous injuries.           Signer Name: Nicolas Conroy MD   Signed: 12/1/2021 10:41 AM   Workstation Name: [id number]    [facility]     Imaging    MRI Knee Left Without Contrast (Order: [id number]) - 11/30/2021    Result History    MRI Knee Left Without Contrast (Order #[id number]) on 12/1/2021 - Order Result History Report      Reprint Order Requisition    MRI Knee Left Without Contrast (Order #[id number]) on 11/30/21     Provider Comment To Patient    Add Comments  Add Notifications        MRI Knee Left Without Contrast: Patient  Communication    Add Comments  Add Notifications        Signed by    Signed Date/Time  Phone Pager   ELIU PEPE 12/01/2021 10:41 AM [phone] [phone]      Exam Information    Status Exam Begun  Exam Ended    Final [99] 11/30/2021 12:42 11/30/2021  2:02 PM [id number]  2:02 PM     Questions    Patient Pregnant Unknown     Reviewed by    Saqib Salazar MD 12/3/2021 07:42     External Results Report    Open External Results Report      Encounter    View Encounter               Intra Procedure Documentation    Intra Procedure Documentation     Order-Level Documents on 11/30/2021:    Scan on 11/30/2021 by [employer], [place]: [facility] PT SCREENING [id number]           Encounter-Level Documents:    There are no encounter-level documents.    Reason for Exam  Priority: Routine  Rule out medial meniscal tear   Dx: Left knee pain, unspecified chronicity [M25.562 (ICD-10-CM)]; Sprain of medial collateral ligament of left knee, initial encounter [S83.412A (ICD-10-CM)]     Results Routing Tracking    View Results Routing Information     Order Report    Order Details        Assessment:        1. Rupture of anterior cruciate ligament of left knee, initial encounter    2. Acute medial meniscus tear of left knee, initial encounter    3. Tear of lateral meniscus of left knee, current, unspecified tear type, initial encounter           Plan: Reviewed the MRI findings with the patient.  Patient reviewed the images and report.  Patient has a complete tear of the ACL of the bilateral meniscal tear as result of this latest work-related injury.  The knee is unstable.  She has been referred to Dr. Watson for surgical consultation.  Office until seen by him.  Answered all questions            Work Status:    DENICE query complete.    Orders:  No orders of the defined types were placed in this encounter.      Medications:  No orders of the defined types were placed in this encounter.      Followup:  Return in about 2 weeks (around  12/24/2021).          Dictated utilizing Dragon dictation

## 2022-01-13 ENCOUNTER — OFFICE VISIT (OUTPATIENT)
Dept: ORTHOPEDIC SURGERY | Facility: CLINIC | Age: 27
End: 2022-01-13

## 2022-01-13 VITALS
SYSTOLIC BLOOD PRESSURE: 142 MMHG | BODY MASS INDEX: 46.93 KG/M2 | HEART RATE: 86 BPM | DIASTOLIC BLOOD PRESSURE: 93 MMHG | HEIGHT: 62 IN | WEIGHT: 255 LBS

## 2022-01-13 DIAGNOSIS — S83.282A TEAR OF LATERAL MENISCUS OF LEFT KNEE, CURRENT, UNSPECIFIED TEAR TYPE, INITIAL ENCOUNTER: ICD-10-CM

## 2022-01-13 DIAGNOSIS — S83.242A ACUTE MEDIAL MENISCUS TEAR OF LEFT KNEE, INITIAL ENCOUNTER: ICD-10-CM

## 2022-01-13 DIAGNOSIS — S83.512A RUPTURE OF ANTERIOR CRUCIATE LIGAMENT OF LEFT KNEE, INITIAL ENCOUNTER: Primary | ICD-10-CM

## 2022-01-13 PROCEDURE — 99214 OFFICE O/P EST MOD 30 MIN: CPT | Performed by: ORTHOPAEDIC SURGERY

## 2022-01-13 NOTE — PROGRESS NOTES
Subjective:     Patient ID: Uri Bedoya is a 26 y.o. female.    Chief Complaint:  Left knee pain and instability, new patient to examiner  History of Present Illness  Uri Bedoya presents to the clinic today for evaluation of left knee pain and instability. She is accompanied by an adult male. The patient sustained her injury in 08/2021 after falling while working a shift at [employer]. She explains the incident occurred as a result of slipping on a puddle of water while washing dishes. Upon falling, her knee twisted and buckled causing injury and stated knee pain. Following the incident she experienced severe swelling and pain that has been consistent since onset. The patient rates her pain a 8 out of 10 that she localizes to the anteromedial aspect of the knee and describes as aching, throbbing in nature associated with episodes of instability. She confirms moderate circumferential tingling radiating down into the leg with some decreased sensation. Her pain is exacerbated with prolonged standing and sudden position change. She notes use of bracing for additional support, which provided mild relief of pain, but denies attempting physical therapy. The patient indicates experiencing occasional history of patella dislocation since the age of 15 that was corrected with procedure performed by Dr. Rodriguez. She has not experienced patella dislocation in 4 to 5 years.     The patient reports history of undergone MPFL reconstruction.     Social History     Occupational History   • Not on file   Tobacco Use   • Smoking status: Never Smoker   • Smokeless tobacco: Never Used   Vaping Use   • Vaping Use: Never used   Substance and Sexual Activity   • Alcohol use: No   • Drug use: No   • Sexual activity: Defer      Past Medical History:   Diagnosis Date   • Anxiety    • Depression    • Migraine    • Recurrent left knee instability     sched patellafemoral junction repair     Past Surgical History:  "  Procedure Laterality Date   • PATELLA SURGERY  02/10/2017   • PATELLOFEMORAL JUNCTION REPAIR Left 2/10/2017    Procedure: PATELLOFEMORAL JUNCTION REPAIR with VMO advancement and medial retinacular reconstruction;  Surgeon: Saqib Salazar MD;  Location: [facility];  Service:    • TONSILLECTOMY AND ADENOIDECTOMY         Family History   Problem Relation Age of Onset   • Diabetes Father          Review of Systems   Musculoskeletal: Positive for arthralgias and myalgias.           Objective:  Vitals:    01/13/22 0825   BP: 142/93   Pulse: 86   Weight: 116 kg (255 lb)   Height: 157.5 cm (62\")         01/13/22 0825   Weight: 116 kg (255 lb)     Body mass index is 46.64 kg/m².  Physical Exam    Vital signs reviewed.   General: No acute distress, alert and oriented  Eyes: conjunctiva clear; pupils equally round and reactive  ENT: external ears and nose atraumatic; oropharynx clear  CV: no peripheral edema  Resp: normal respiratory effort  Skin: no rashes or wounds; normal turgor  Psych: mood and affect appropriate; recent and remote memory intact          Ortho Exam     Left Knee-   Prior anterior incision is well healed.  Active ROM 3 degrees of hypertension to 130 degrees of flexion  4+/5 on flexion  4+/5 on extension  Maximal tenderness medial and lateral joint line   Effusion-  Moderate  Grade 2B Lachman  Anterior drawer- 1+ with soft endpoint   Posterior drawer- Negative  Posterior lateral drawer- Negative  Logroll- Negative   Stinchfield- Negative  Stable opening on varus and valgus stress at 0 and 30  Active patellar compression test- Negative   Dial test- Negative   Positive but decreased sensation in all distributions left lower extremity compared to the right  Brisk cap refill all digits  2+ dorsalis pedis pulse  Imaging:  Reviewed prior x-rays left knee from September 2021 indicate no evidence of fracture, dislocation, or subluxation with acceptable overall alignment knee.  There is evidence of an osseous tunnel " in the medial femoral condyle consistent with MPFL reconstruction as well as osseous tunnel in the medial aspect of the patella with increased lateral patella tilt and lateral patella positioning on axial view.      MRI Knee LT WO    INDICATION:   Medial left knee pain status post twisting injury 2 months ago. Prior medial patellofemoral ligament reconstruction 4 years ago.    TECHNIQUE:   MRI of the left knee without IV contrast.    COMPARISON:   MRI left knee 10/14/2016    FINDINGS:   Menisci:  There is a vertical radial tear of the posterior horn lateral meniscus. Questionable subtle horizontal oblique undersurface tear along the peripheral third of the posterior horn medial meniscus. There is also increased signal along the meniscocapsular  junction of the posterior horn medial meniscus raising the possibility of meniscocapsular injury.    Ligaments:   Cruciate ligaments: Complete rupture of the mid substance of the anterior cruciate ligament. Posterior cruciate ligament is intact.  Medial collateral ligament complex: Intact.  Lateral collateral ligament complex: Intact.    Extensor mechanism: Intact. Postsurgical changes from prior reconstruction of the medial patellofemoral ligament, which appears otherwise stable and intact. Patellofemoral alignment appears anatomic.    Fluid: Small to moderate joint effusion. 8 cm popliteal cyst.    Articular cartilage:  Patellofemoral compartment: No hyaline cartilage disease.  Medial compartment: No hyaline cartilage disease.  Lateral compartment: No hyaline cartilage disease.    Osseous structures and musculature:   No fracture, stress reaction, or osseous lesion. Bone marrow signal is within normal limits. Visualized musculature is within normal limits.   Report Conclusions  IMPRESSION:   1. Vertical radial tear of the posterior horn lateral meniscus.  2. Questionable subtle horizontal oblique undersurface tear along the peripheral third of the posterior horn medial  meniscus. There is also increased signal along the meniscocapsular junction of the posterior horn medial meniscus raising the possibility  of meniscocapsular injury.  3. Complete rupture of the mid substance of the ACL.  4. Stable postsurgical changes from prior medial patellofemoral ligament reconstruction. Patellofemoral alignment appears anatomic.  5. PCL and collateral ligaments appear intact.  6. Small to moderate joint effusion with an 8 cm popliteal cyst.  7. No evidence of significant articular cartilage loss. No acute osseous injuries.       Review of MRI report noted above of left knee include review of images as well as radiology report indicates left knee ACL tear, prior medial patellofemoral ligament reconstruction that appears to be grossly intact with tunnels and medial patella and medial femoral condyle.  There is evidence of a radial tear of the posterior horn lateral meniscus as well as a meniscal capsular peripheral vertical tear of the posterior horn the medial meniscus.  PCL and collateral ligaments appear to be grossly intact.    Assessment:        1. Rupture of anterior cruciate ligament of left knee, initial encounter    2. Acute medial meniscus tear of left knee, initial encounter    3. Tear of lateral meniscus of left knee, current, unspecified tear type, initial encounter           Plan:        Plan:  1. Discussed treatment options at length with patient at today's visit in regards to her left knee. We reviewed options for non-operative treatment as well as surgical treatment. She     would like to proceed with surgery at this point in time given her persistent instability and failure of improvement with conservative treatment up to this point.   2. Plan will be for left knee ACL reconstruction with possible hamstring allograft supplement versus complete allograft given concern for possible adhesions to her hamstrings form prior procedure. I reviewed details of procedure including  pertinent anatomy with the patient as well as risks benefits and alternatives, with the risks including not limited to neurovascular damage, bleeding, infection, re-tear of graft, failure of healing of graft, loss of motion, weakness, stiffness, instability, chondrolysis, DVT, pulmonary embolus, death, stroke, complex regional pain syndrome, myocardial infarction, need for additional procedures.  Patient understood all these had all questions answered before verbally consenting to proceed with surgery.  No guarantees were given regarding results of procedure.  3.  The patient has no history of DVT, cardiovascular disease, or diabetes.  4.  We will follow up with the patient for surgical treatment      Uri Bedoya was in agreement with plan and had all questions answered.     Orders:  Orders Placed This Encounter   Procedures   • COVID PRE-OP / PRE-PROCEDURE SCREENING ORDER (NO ISOLATION) - Swab, Nasopharynx   • Follow Anesthesia Guidelines / Standing Orders   • Provide instructions to patient regarding NPO status   • Provide Patient With ERAS Hydration Instructions   • Provide Patient With Enhanced Recovery Booklet(s) or Handout       Medications:  No orders of the defined types were placed in this encounter.      Followup:  No follow-ups on file.    Diagnoses and all orders for this visit:    1. Rupture of anterior cruciate ligament of left knee, initial encounter (Primary)  -     Case Request; Standing  -     COVID PRE-OP / PRE-PROCEDURE SCREENING ORDER (NO ISOLATION) - Swab, Nasopharynx; Future  -     Case Request    2. Acute medial meniscus tear of left knee, initial encounter  -     Case Request; Standing  -     COVID PRE-OP / PRE-PROCEDURE SCREENING ORDER (NO ISOLATION) - Swab, Nasopharynx; Future  -     Case Request    3. Tear of lateral meniscus of left knee, current, unspecified tear type, initial encounter  -     Case Request; Standing  -     COVID PRE-OP / PRE-PROCEDURE SCREENING ORDER (NO  ISOLATION) - Swab, Nasopharynx; Future  -     Case Request    Other orders  -     Follow Anesthesia Guidelines / Standing Orders; Future  -     Provide instructions to patient regarding NPO status; Future  -     Provide Patient With ERAS Hydration Instructions  -     Provide Patient With Enhanced Recovery Booklet(s) or Handout          Dictated utilizing Dragon dictation   The patient has consented to being recorded using MINI    Transcribed from ambient dictation for Ruiz Watson MD by Leonie Sy.  01/13/22   11:36 EST    Patient verbalized consent to the visit recording.  I have personally performed the services described in this document as transcribed by the above individual, and it is both accurate and complete.  Ruiz Watson MD  1/16/2022  22:37 EST

## 2022-01-16 RX ORDER — ACETAMINOPHEN 325 MG/1
1000 TABLET ORAL ONCE
Status: CANCELLED | OUTPATIENT
Start: 2022-01-16 | End: 2022-01-16

## 2022-01-16 RX ORDER — MELOXICAM 7.5 MG/1
7.5 TABLET ORAL ONCE
Status: CANCELLED | OUTPATIENT
Start: 2022-01-16 | End: 2022-01-16

## 2022-01-16 RX ORDER — PREGABALIN 150 MG/1
150 CAPSULE ORAL ONCE
Status: CANCELLED | OUTPATIENT
Start: 2022-01-16 | End: 2022-01-16

## 2022-02-07 ENCOUNTER — TELEPHONE (OUTPATIENT)
Dept: ORTHOPEDIC SURGERY | Facility: CLINIC | Age: 27
End: 2022-02-07

## 2022-02-07 NOTE — TELEPHONE ENCOUNTER
Called patient and informed her that her WC sent us a letter that they are denying any services for Ms Bedoya until she fills out and returns to them some state required forms.

## 2022-03-28 RX ORDER — MELOXICAM 15 MG/1
TABLET ORAL
Qty: 30 TABLET | Refills: 5 | Status: SHIPPED | OUTPATIENT
Start: 2022-03-28 | End: 2022-09-09

## 2022-09-09 RX ORDER — MELOXICAM 15 MG/1
TABLET ORAL
Qty: 30 TABLET | Refills: 5 | Status: SHIPPED | OUTPATIENT
Start: 2022-09-09 | End: 2023-02-22

## 2022-09-09 NOTE — TELEPHONE ENCOUNTER
Rx Refill Note  Requested Prescriptions     Pending Prescriptions Disp Refills   • meloxicam (MOBIC) 15 MG tablet [Pharmacy Med Name: MELOXICAM 15 MG TABLET] 30 tablet 5     Sig: TAKE 1 TABLET BY MOUTH EVERY DAY      Last office visit with prescribing clinician: 1/13/22      Next office visit with prescribing clinician:   Last Filled:3/28/22    Rupture of anterior cruciate ligament of left knee, initial            Date of Surgery:      Magaly Huerta MA  09/09/22, 09:28 EDT    Previous RX pended for your approval, change or denial.     {TIP  Encounters:    {TIP  Please add Last Relevant Lab Date if appropriate:  {TIP  Is Refill Pharmacy correct?:

## 2024-10-31 PROBLEM — M75.81 TENDINITIS OF RIGHT ROTATOR CUFF: Status: ACTIVE | Noted: 2021-04-22

## 2024-10-31 PROBLEM — L05.91 PILONIDAL CYST: Status: ACTIVE | Noted: 2023-11-30

## 2024-10-31 PROBLEM — E11.9 TYPE 2 DIABETES MELLITUS: Status: ACTIVE | Noted: 2023-08-21

## 2024-10-31 PROBLEM — F33.1 RECURRENT MAJOR DEPRESSIVE EPISODES, MODERATE: Status: ACTIVE | Noted: 2024-10-31

## 2024-10-31 PROBLEM — M25.519 ACROMIOCLAVICULAR JOINT PAIN: Status: ACTIVE | Noted: 2021-04-22

## 2024-10-31 PROBLEM — R05.9 COUGH: Status: ACTIVE | Noted: 2021-08-03

## 2024-10-31 PROBLEM — K92.1 MELENA: Status: ACTIVE | Noted: 2021-08-03

## 2024-10-31 PROBLEM — R73.9 HYPERGLYCEMIA: Status: ACTIVE | Noted: 2023-08-03

## 2024-10-31 PROBLEM — I10 HYPERTENSIVE DISORDER: Status: ACTIVE | Noted: 2024-10-31

## 2024-10-31 PROBLEM — K59.00 CONSTIPATION: Status: ACTIVE | Noted: 2021-08-03

## 2024-10-31 PROBLEM — M75.51 SUBACROMIAL BURSITIS OF RIGHT SHOULDER JOINT: Status: ACTIVE | Noted: 2021-04-22

## 2024-10-31 PROBLEM — F32.0 MILD MAJOR DEPRESSION, SINGLE EPISODE: Status: ACTIVE | Noted: 2019-03-06

## 2024-10-31 PROBLEM — F41.1 GENERALIZED ANXIETY DISORDER: Status: ACTIVE | Noted: 2024-10-31

## 2024-10-31 PROBLEM — R63.5 WEIGHT GAIN: Status: ACTIVE | Noted: 2021-08-03

## 2025-08-25 ENCOUNTER — OFFICE VISIT (OUTPATIENT)
Dept: ORTHOPEDIC SURGERY | Facility: CLINIC | Age: 30
End: 2025-08-25
Payer: COMMERCIAL

## 2025-08-25 VITALS
BODY MASS INDEX: 46.53 KG/M2 | SYSTOLIC BLOOD PRESSURE: 130 MMHG | DIASTOLIC BLOOD PRESSURE: 84 MMHG | HEIGHT: 60 IN | HEART RATE: 99 BPM | WEIGHT: 237 LBS

## 2025-08-25 DIAGNOSIS — Z98.890 HISTORY OF REPAIR OF ANTERIOR CRUCIATE LIGAMENT OF LEFT KNEE: ICD-10-CM

## 2025-08-25 DIAGNOSIS — S83.002A SUBLUXATION OF LEFT PATELLA, INITIAL ENCOUNTER: ICD-10-CM

## 2025-08-25 DIAGNOSIS — M25.562 LEFT KNEE PAIN, UNSPECIFIED CHRONICITY: ICD-10-CM

## 2025-08-25 DIAGNOSIS — M25.562 MECHANICAL KNEE PAIN, LEFT: Primary | ICD-10-CM

## 2025-08-25 PROCEDURE — 1159F MED LIST DOCD IN RCRD: CPT | Performed by: NURSE PRACTITIONER

## 2025-08-25 PROCEDURE — 1160F RVW MEDS BY RX/DR IN RCRD: CPT | Performed by: NURSE PRACTITIONER

## 2025-08-25 PROCEDURE — 3075F SYST BP GE 130 - 139MM HG: CPT | Performed by: NURSE PRACTITIONER

## 2025-08-25 PROCEDURE — 3079F DIAST BP 80-89 MM HG: CPT | Performed by: NURSE PRACTITIONER

## 2025-08-25 PROCEDURE — 99213 OFFICE O/P EST LOW 20 MIN: CPT | Performed by: NURSE PRACTITIONER

## 2025-08-26 ENCOUNTER — PATIENT ROUNDING (BHMG ONLY) (OUTPATIENT)
Dept: ORTHOPEDIC SURGERY | Facility: CLINIC | Age: 30
End: 2025-08-26
Payer: COMMERCIAL

## (undated) DEVICE — TOWEL,OR,DSP,ST,BLUE,STD,4/PK,20PK/CS: Brand: MEDLINE

## (undated) DEVICE — SYR CONTRL LUERLOK 10CC

## (undated) DEVICE — GLV SURG SENSICARE ORTHO PF LF 8 STRL

## (undated) DEVICE — TP CAST SCOTCHCAST PLS 4IN 4YD BLU

## (undated) DEVICE — Device

## (undated) DEVICE — GLV SURG NEOLON 2G PF LF 8 STRL

## (undated) DEVICE — TP CAST SCOTCHCAST PLS 5IN 4YD BLU

## (undated) DEVICE — NDL SPINE 22G 31/2IN BLK

## (undated) DEVICE — DRP C/ARM 41X74IN

## (undated) DEVICE — SYS SKIN CLS DERMABOND PRINEO W/22CM MESH TP

## (undated) DEVICE — DRAPE,U/ SHT,SPLIT,PLAS,STERIL: Brand: MEDLINE

## (undated) DEVICE — ULTRACLEAN ACCESSORY ELECTRODE 1" (2.54 CM) COATED BLADE: Brand: ULTRACLEAN

## (undated) DEVICE — DRSNG ADAPTIC 3X8

## (undated) DEVICE — 3M™ STERI-STRIP™ COMPOUND BENZOIN TINCTURE 40 BAGS/CARTON 4 CARTONS/CASE C1544: Brand: 3M™ STERI-STRIP™

## (undated) DEVICE — BNDG ESMARK 6INX9FT STRL

## (undated) DEVICE — GOWN,PREVENTION PLUS,XLARGE,STERILE: Brand: MEDLINE

## (undated) DEVICE — GLV SURG NEOLON 2G PF LF 8.5 STRL

## (undated) DEVICE — SUCTION CANISTER, 1000CC,SAFELINER: Brand: DEROYAL

## (undated) DEVICE — SUCTION CANISTER, 3000CC,SAFELINER: Brand: DEROYAL

## (undated) DEVICE — CATH IV INSYTE AUTOGARD 14G 1 1/2IN ORNG

## (undated) DEVICE — SPNG GZ STRL 2S 4X4 12PLY

## (undated) DEVICE — APPL CHLORAPREP W/TINT 26ML ORNG

## (undated) DEVICE — PENCL E/S HNDSWCH PUSHBTN HOLSTR 10FT

## (undated) DEVICE — 3M™ IOBAN™ 2 ANTIMICROBIAL INCISE DRAPE 6650EZ: Brand: IOBAN™ 2

## (undated) DEVICE — BANDAGE,GAUZE,BULKEE II,4.5"X4.1YD,STRL: Brand: MEDLINE

## (undated) DEVICE — DRAPE,REIN 53X77,STERILE: Brand: MEDLINE

## (undated) DEVICE — SPNG GZ WOVN 4X4IN 12PLY 10/BX STRL

## (undated) DEVICE — 3M™ IOBAN™ 2 ANTIMICROBIAL INCISE DRAPE 6640EZ: Brand: IOBAN™ 2

## (undated) DEVICE — 3M™ STERI-STRIP™ REINFORCED ADHESIVE SKIN CLOSURES, R1547, 1/2 IN X 4 IN (12 MM X 100 MM), 6 STRIPS/ENVELOPE: Brand: 3M™ STERI-STRIP™

## (undated) DEVICE — SPNG LAP 18X18IN LF STRL PK/5

## (undated) DEVICE — TRY SKINPREP WET CHG 4PCT SPNG HIB

## (undated) DEVICE — DRP Z/FRICTION 10X16IN

## (undated) DEVICE — DISPOSABLE TOURNIQUET CUFF SINGLE BLADDER, SINGLE PORT AND LUER LOCK CONNECTOR: Brand: COLOR CUFF

## (undated) DEVICE — CONTAINER,SPECIMEN,OR STERILE,4OZ: Brand: MEDLINE

## (undated) DEVICE — PAD GRND REM POLYHESIVE A/ DISP

## (undated) DEVICE — BNDG ELAS W/CLIP 4IN 5YD LF STRL

## (undated) DEVICE — TP CAST SCOTCHCAST PLS 4IN 4YD BLK

## (undated) DEVICE — WEBRIL* CAST PADDING: Brand: DEROYAL

## (undated) DEVICE — BNDG ELAS W/CLIP 6IN 5YD LF STRL

## (undated) DEVICE — STCKNT STRL 6X48 IN

## (undated) DEVICE — KT ACL TRANSTIB WO/ SAWBLD

## (undated) DEVICE — GLV SURG EUDERMIC PF LTX 8 STRL